# Patient Record
Sex: MALE | Race: WHITE
[De-identification: names, ages, dates, MRNs, and addresses within clinical notes are randomized per-mention and may not be internally consistent; named-entity substitution may affect disease eponyms.]

---

## 2018-09-21 ENCOUNTER — HOSPITAL ENCOUNTER (EMERGENCY)
Dept: HOSPITAL 35 - ER | Age: 78
Discharge: HOME | End: 2018-09-21
Payer: COMMERCIAL

## 2018-09-21 VITALS — HEIGHT: 72 IN | BODY MASS INDEX: 24.11 KG/M2 | WEIGHT: 178 LBS

## 2018-09-21 DIAGNOSIS — Z88.8: ICD-10-CM

## 2018-09-21 DIAGNOSIS — G89.29: Primary | ICD-10-CM

## 2018-09-21 DIAGNOSIS — Z79.899: ICD-10-CM

## 2018-09-21 DIAGNOSIS — I25.10: ICD-10-CM

## 2018-09-21 DIAGNOSIS — Z91.041: ICD-10-CM

## 2018-09-21 DIAGNOSIS — E78.5: ICD-10-CM

## 2018-09-21 DIAGNOSIS — Z88.0: ICD-10-CM

## 2018-09-21 DIAGNOSIS — Z88.1: ICD-10-CM

## 2018-09-21 DIAGNOSIS — Z86.73: ICD-10-CM

## 2018-09-21 DIAGNOSIS — I10: ICD-10-CM

## 2018-09-21 DIAGNOSIS — L40.9: ICD-10-CM

## 2018-09-21 DIAGNOSIS — M54.5: ICD-10-CM

## 2018-09-21 DIAGNOSIS — Z96.643: ICD-10-CM

## 2018-11-17 ENCOUNTER — HOSPITAL ENCOUNTER (EMERGENCY)
Dept: HOSPITAL 35 - ER | Age: 78
Discharge: HOME | End: 2018-11-17
Payer: COMMERCIAL

## 2018-11-17 VITALS — HEIGHT: 72 IN | WEIGHT: 178 LBS | BODY MASS INDEX: 24.11 KG/M2

## 2018-11-17 VITALS — DIASTOLIC BLOOD PRESSURE: 71 MMHG | SYSTOLIC BLOOD PRESSURE: 130 MMHG

## 2018-11-17 DIAGNOSIS — Z88.1: ICD-10-CM

## 2018-11-17 DIAGNOSIS — Z88.8: ICD-10-CM

## 2018-11-17 DIAGNOSIS — M48.02: ICD-10-CM

## 2018-11-17 DIAGNOSIS — M54.41: ICD-10-CM

## 2018-11-17 DIAGNOSIS — M54.42: Primary | ICD-10-CM

## 2018-11-17 DIAGNOSIS — N40.0: ICD-10-CM

## 2018-11-17 DIAGNOSIS — Z88.0: ICD-10-CM

## 2018-11-17 DIAGNOSIS — E78.5: ICD-10-CM

## 2018-11-17 DIAGNOSIS — Z96.643: ICD-10-CM

## 2018-11-17 DIAGNOSIS — I10: ICD-10-CM

## 2018-11-17 DIAGNOSIS — I25.10: ICD-10-CM

## 2018-11-17 DIAGNOSIS — Z98.62: ICD-10-CM

## 2018-11-17 DIAGNOSIS — Z91.041: ICD-10-CM

## 2018-11-17 LAB
ANION GAP SERPL CALC-SCNC: 9 MMOL/L (ref 7–16)
BASOPHILS NFR BLD AUTO: 0.9 % (ref 0–2)
BILIRUB UR-MCNC: NEGATIVE MG/DL
BUN SERPL-MCNC: 15 MG/DL (ref 7–18)
CALCIUM SERPL-MCNC: 10.3 MG/DL (ref 8.5–10.1)
CHLORIDE SERPL-SCNC: 101 MMOL/L (ref 98–107)
CO2 SERPL-SCNC: 25 MMOL/L (ref 21–32)
COLOR UR: YELLOW
CREAT SERPL-MCNC: 1.6 MG/DL (ref 0.7–1.3)
EOSINOPHIL NFR BLD: 1.3 % (ref 0–3)
ERYTHROCYTE [DISTWIDTH] IN BLOOD BY AUTOMATED COUNT: 14.6 % (ref 10.5–14.5)
GLUCOSE SERPL-MCNC: 87 MG/DL (ref 74–106)
GRANULOCYTES NFR BLD MANUAL: 58.4 % (ref 36–66)
HCT VFR BLD CALC: 34.1 % (ref 42–52)
HGB BLD-MCNC: 11.9 GM/DL (ref 14–18)
KETONES UR STRIP-MCNC: NEGATIVE MG/DL
LYMPHOCYTES NFR BLD AUTO: 30.9 % (ref 24–44)
MCH RBC QN AUTO: 33.9 PG (ref 26–34)
MCHC RBC AUTO-ENTMCNC: 35 G/DL (ref 28–37)
MCV RBC: 96.8 FL (ref 80–100)
MONOCYTES NFR BLD: 8.5 % (ref 1–8)
NEUTROPHILS # BLD: 4.5 THOU/UL (ref 1.4–8.2)
PLATELET # BLD: 161 THOU/UL (ref 150–400)
POTASSIUM SERPL-SCNC: 4.2 MMOL/L (ref 3.5–5.1)
RBC # BLD AUTO: 3.53 MIL/UL (ref 4.5–6)
RBC # UR STRIP: NEGATIVE /UL
SODIUM SERPL-SCNC: 135 MMOL/L (ref 136–145)
SP GR UR STRIP: 1.01 (ref 1–1.03)
URINE CLARITY: CLEAR
URINE GLUCOSE-RANDOM*: NEGATIVE
URINE LEUKOCYTES-REFLEX: NEGATIVE
URINE NITRITE-REFLEX: NEGATIVE
URINE PROTEIN (DIPSTICK): NEGATIVE
UROBILINOGEN UR STRIP-ACNC: 0.2 E.U./DL (ref 0.2–1)
WBC # BLD AUTO: 7.7 THOU/UL (ref 4–11)

## 2018-12-04 ENCOUNTER — HOSPITAL ENCOUNTER (EMERGENCY)
Dept: HOSPITAL 35 - ER | Age: 78
Discharge: HOME | End: 2018-12-04
Payer: COMMERCIAL

## 2018-12-04 VITALS — SYSTOLIC BLOOD PRESSURE: 152 MMHG | DIASTOLIC BLOOD PRESSURE: 74 MMHG

## 2018-12-04 VITALS — HEIGHT: 72 IN | BODY MASS INDEX: 23.03 KG/M2 | WEIGHT: 170 LBS

## 2018-12-04 DIAGNOSIS — Z91.041: ICD-10-CM

## 2018-12-04 DIAGNOSIS — R19.7: ICD-10-CM

## 2018-12-04 DIAGNOSIS — I25.10: ICD-10-CM

## 2018-12-04 DIAGNOSIS — M19.90: ICD-10-CM

## 2018-12-04 DIAGNOSIS — Z88.0: ICD-10-CM

## 2018-12-04 DIAGNOSIS — Z96.643: ICD-10-CM

## 2018-12-04 DIAGNOSIS — Z88.1: ICD-10-CM

## 2018-12-04 DIAGNOSIS — Z91.048: ICD-10-CM

## 2018-12-04 DIAGNOSIS — R10.33: Primary | ICD-10-CM

## 2018-12-04 DIAGNOSIS — L40.9: ICD-10-CM

## 2018-12-04 DIAGNOSIS — E78.5: ICD-10-CM

## 2018-12-04 DIAGNOSIS — I10: ICD-10-CM

## 2018-12-04 DIAGNOSIS — Z88.8: ICD-10-CM

## 2018-12-04 LAB
ALBUMIN SERPL-MCNC: 3.5 G/DL (ref 3.4–5)
ALT SERPL-CCNC: 19 U/L (ref 30–65)
ANION GAP SERPL CALC-SCNC: 8 MMOL/L (ref 7–16)
AST SERPL-CCNC: 17 U/L (ref 15–37)
BASOPHILS NFR BLD AUTO: 0.9 % (ref 0–2)
BILIRUB SERPL-MCNC: 0.4 MG/DL
BILIRUB UR-MCNC: NEGATIVE MG/DL
BUN SERPL-MCNC: 15 MG/DL (ref 7–18)
CALCIUM SERPL-MCNC: 9.6 MG/DL (ref 8.5–10.1)
CHLORIDE SERPL-SCNC: 102 MMOL/L (ref 98–107)
CO2 SERPL-SCNC: 26 MMOL/L (ref 21–32)
COLOR UR: YELLOW
CREAT SERPL-MCNC: 1.6 MG/DL (ref 0.7–1.3)
EOSINOPHIL NFR BLD: 0.5 % (ref 0–3)
ERYTHROCYTE [DISTWIDTH] IN BLOOD BY AUTOMATED COUNT: 14.5 % (ref 10.5–14.5)
GLUCOSE SERPL-MCNC: 106 MG/DL (ref 74–106)
GRANULOCYTES NFR BLD MANUAL: 58.6 % (ref 36–66)
HCT VFR BLD CALC: 31.1 % (ref 42–52)
HGB BLD-MCNC: 10.8 GM/DL (ref 14–18)
KETONES UR STRIP-MCNC: NEGATIVE MG/DL
LIPASE: 128 U/L (ref 73–393)
LYMPHOCYTES NFR BLD AUTO: 29.1 % (ref 24–44)
MCH RBC QN AUTO: 33.9 PG (ref 26–34)
MCHC RBC AUTO-ENTMCNC: 34.6 G/DL (ref 28–37)
MCV RBC: 98 FL (ref 80–100)
MONOCYTES NFR BLD: 10.9 % (ref 1–8)
NEUTROPHILS # BLD: 3.5 THOU/UL (ref 1.4–8.2)
NITRITE UR QL STRIP: NEGATIVE
PLATELET # BLD: 194 THOU/UL (ref 150–400)
POTASSIUM SERPL-SCNC: 3.8 MMOL/L (ref 3.5–5.1)
PROT SERPL-MCNC: 8.7 G/DL (ref 6.4–8.2)
RBC # BLD AUTO: 3.18 MIL/UL (ref 4.5–6)
RBC # UR STRIP: NEGATIVE /UL
SODIUM SERPL-SCNC: 136 MMOL/L (ref 136–145)
SP GR UR STRIP: 1.01 (ref 1–1.03)
URINE CLARITY: CLEAR
URINE GLUCOSE-RANDOM*: NEGATIVE
URINE LEUKOCYTES: NEGATIVE
URINE PROTEIN (DIPSTICK): NEGATIVE
UROBILINOGEN UR STRIP-ACNC: 0.2 E.U./DL (ref 0.2–1)
WBC # BLD AUTO: 6 THOU/UL (ref 4–11)

## 2019-03-13 ENCOUNTER — HOSPITAL ENCOUNTER (INPATIENT)
Dept: HOSPITAL 35 - ER | Age: 79
LOS: 3 days | Discharge: HOME | DRG: 438 | End: 2019-03-16
Attending: INTERNAL MEDICINE | Admitting: INTERNAL MEDICINE
Payer: COMMERCIAL

## 2019-03-13 VITALS — DIASTOLIC BLOOD PRESSURE: 62 MMHG | SYSTOLIC BLOOD PRESSURE: 143 MMHG

## 2019-03-13 VITALS — SYSTOLIC BLOOD PRESSURE: 119 MMHG | DIASTOLIC BLOOD PRESSURE: 56 MMHG

## 2019-03-13 VITALS — HEIGHT: 72.01 IN | BODY MASS INDEX: 24.26 KG/M2 | WEIGHT: 179.1 LBS

## 2019-03-13 VITALS — SYSTOLIC BLOOD PRESSURE: 144 MMHG | DIASTOLIC BLOOD PRESSURE: 63 MMHG

## 2019-03-13 VITALS — SYSTOLIC BLOOD PRESSURE: 113 MMHG | DIASTOLIC BLOOD PRESSURE: 57 MMHG

## 2019-03-13 DIAGNOSIS — D69.6: ICD-10-CM

## 2019-03-13 DIAGNOSIS — Z88.1: ICD-10-CM

## 2019-03-13 DIAGNOSIS — E53.8: ICD-10-CM

## 2019-03-13 DIAGNOSIS — K21.9: ICD-10-CM

## 2019-03-13 DIAGNOSIS — Z88.0: ICD-10-CM

## 2019-03-13 DIAGNOSIS — K85.90: Primary | ICD-10-CM

## 2019-03-13 DIAGNOSIS — Z95.5: ICD-10-CM

## 2019-03-13 DIAGNOSIS — Z72.89: ICD-10-CM

## 2019-03-13 DIAGNOSIS — M62.84: ICD-10-CM

## 2019-03-13 DIAGNOSIS — I25.2: ICD-10-CM

## 2019-03-13 DIAGNOSIS — Z88.8: ICD-10-CM

## 2019-03-13 DIAGNOSIS — G89.29: ICD-10-CM

## 2019-03-13 DIAGNOSIS — M54.5: ICD-10-CM

## 2019-03-13 DIAGNOSIS — I10: ICD-10-CM

## 2019-03-13 DIAGNOSIS — Z96.643: ICD-10-CM

## 2019-03-13 DIAGNOSIS — E78.5: ICD-10-CM

## 2019-03-13 DIAGNOSIS — Z79.899: ICD-10-CM

## 2019-03-13 DIAGNOSIS — Z87.891: ICD-10-CM

## 2019-03-13 DIAGNOSIS — D53.1: ICD-10-CM

## 2019-03-13 DIAGNOSIS — R74.0: ICD-10-CM

## 2019-03-13 DIAGNOSIS — Z91.041: ICD-10-CM

## 2019-03-13 DIAGNOSIS — N32.89: ICD-10-CM

## 2019-03-13 DIAGNOSIS — D53.9: ICD-10-CM

## 2019-03-13 DIAGNOSIS — Z86.73: ICD-10-CM

## 2019-03-13 DIAGNOSIS — K81.9: ICD-10-CM

## 2019-03-13 DIAGNOSIS — I25.10: ICD-10-CM

## 2019-03-13 DIAGNOSIS — E43: ICD-10-CM

## 2019-03-13 DIAGNOSIS — M19.90: ICD-10-CM

## 2019-03-13 DIAGNOSIS — N40.0: ICD-10-CM

## 2019-03-13 DIAGNOSIS — L40.9: ICD-10-CM

## 2019-03-13 DIAGNOSIS — Z79.82: ICD-10-CM

## 2019-03-13 LAB
ALBUMIN SERPL-MCNC: 3.7 G/DL (ref 3.4–5)
ALT SERPL-CCNC: 34 U/L (ref 30–65)
ANION GAP SERPL CALC-SCNC: 9 MMOL/L (ref 7–16)
AST SERPL-CCNC: 54 U/L (ref 15–37)
BASOPHILS NFR BLD AUTO: 0.3 % (ref 0–2)
BILIRUB SERPL-MCNC: 1.8 MG/DL
BILIRUB UR-MCNC: NEGATIVE MG/DL
BUN SERPL-MCNC: 15 MG/DL (ref 7–18)
CALCIUM SERPL-MCNC: 9.4 MG/DL (ref 8.5–10.1)
CHLORIDE SERPL-SCNC: 98 MMOL/L (ref 98–107)
CO2 SERPL-SCNC: 26 MMOL/L (ref 21–32)
COLOR UR: YELLOW
CREAT SERPL-MCNC: 1.3 MG/DL (ref 0.7–1.3)
EOSINOPHIL NFR BLD: 0.3 % (ref 0–3)
ERYTHROCYTE [DISTWIDTH] IN BLOOD BY AUTOMATED COUNT: 15.9 % (ref 10.5–14.5)
GLUCOSE SERPL-MCNC: 113 MG/DL (ref 74–106)
GRANULOCYTES NFR BLD MANUAL: 84.6 % (ref 36–66)
HCT VFR BLD CALC: 31.3 % (ref 42–52)
HGB BLD-MCNC: 10.7 GM/DL (ref 14–18)
KETONES UR STRIP-MCNC: NEGATIVE MG/DL
LYMPHOCYTES NFR BLD AUTO: 11.6 % (ref 24–44)
MCH RBC QN AUTO: 34.5 PG (ref 26–34)
MCHC RBC AUTO-ENTMCNC: 34.2 G/DL (ref 28–37)
MCV RBC: 100.9 FL (ref 80–100)
MONOCYTES NFR BLD: 3.2 % (ref 1–8)
NEUTROPHILS # BLD: 8 THOU/UL (ref 1.4–8.2)
NITRITE UR QL STRIP: NEGATIVE
PLATELET # BLD: 113 THOU/UL (ref 150–400)
POTASSIUM SERPL-SCNC: 3.7 MMOL/L (ref 3.5–5.1)
PROT SERPL-MCNC: 8.6 G/DL (ref 6.4–8.2)
RBC # BLD AUTO: 3.1 MIL/UL (ref 4.5–6)
RBC # UR STRIP: NEGATIVE /UL
SODIUM SERPL-SCNC: 133 MMOL/L (ref 136–145)
SP GR UR STRIP: <= 1.005 (ref 1–1.03)
URINE CLARITY: CLEAR
URINE GLUCOSE-RANDOM*: NEGATIVE
URINE LEUKOCYTES: NEGATIVE
URINE PROTEIN (DIPSTICK): NEGATIVE
UROBILINOGEN UR STRIP-ACNC: 0.2 E.U./DL (ref 0.2–1)
WBC # BLD AUTO: 9.4 THOU/UL (ref 4–11)

## 2019-03-13 PROCEDURE — 10045: CPT

## 2019-03-14 VITALS — SYSTOLIC BLOOD PRESSURE: 106 MMHG | DIASTOLIC BLOOD PRESSURE: 77 MMHG

## 2019-03-14 VITALS — SYSTOLIC BLOOD PRESSURE: 104 MMHG | DIASTOLIC BLOOD PRESSURE: 57 MMHG

## 2019-03-14 VITALS — DIASTOLIC BLOOD PRESSURE: 58 MMHG | SYSTOLIC BLOOD PRESSURE: 116 MMHG

## 2019-03-14 VITALS — DIASTOLIC BLOOD PRESSURE: 61 MMHG | SYSTOLIC BLOOD PRESSURE: 122 MMHG

## 2019-03-14 LAB
ALBUMIN SERPL-MCNC: 3 G/DL (ref 3.4–5)
ALT SERPL-CCNC: 35 U/L (ref 30–65)
ANION GAP SERPL CALC-SCNC: 9 MMOL/L (ref 7–16)
AST SERPL-CCNC: 45 U/L (ref 15–37)
BILIRUB SERPL-MCNC: 0.9 MG/DL
BUN SERPL-MCNC: 14 MG/DL (ref 7–18)
CALCIUM SERPL-MCNC: 8.4 MG/DL (ref 8.5–10.1)
CHLORIDE SERPL-SCNC: 104 MMOL/L (ref 98–107)
CO2 SERPL-SCNC: 25 MMOL/L (ref 21–32)
CREAT SERPL-MCNC: 1.4 MG/DL (ref 0.7–1.3)
ERYTHROCYTE [DISTWIDTH] IN BLOOD BY AUTOMATED COUNT: 15.9 % (ref 10.5–14.5)
GLUCOSE SERPL-MCNC: 90 MG/DL (ref 74–106)
HCT VFR BLD CALC: 27.4 % (ref 42–52)
HGB BLD-MCNC: 9.5 GM/DL (ref 14–18)
MCH RBC QN AUTO: 35.1 PG (ref 26–34)
MCHC RBC AUTO-ENTMCNC: 34.9 G/DL (ref 28–37)
MCV RBC: 100.8 FL (ref 80–100)
PLATELET # BLD: 91 THOU/UL (ref 150–400)
POTASSIUM SERPL-SCNC: 3.9 MMOL/L (ref 3.5–5.1)
PROT SERPL-MCNC: 7.5 G/DL (ref 6.4–8.2)
RBC # BLD AUTO: 2.72 MIL/UL (ref 4.5–6)
SODIUM SERPL-SCNC: 138 MMOL/L (ref 136–145)
WBC # BLD AUTO: 8.9 THOU/UL (ref 4–11)

## 2019-03-14 NOTE — NUR
ASSUMED PATIENT CARE AT 0715. PATIENT AWAKE, ALERT AND ORIENTED X 4. PATIENT
FRUSTRATED BECAUSE HE IS NPO. MOUTH SWABS PROVIDED WITH ICE WATER. USED URINAL
AT BEDSIDE. MEDICATED WITH MORPHINE THIS MORNING AND SLEPT FOR A COUPLE OF
HOURS. WIFE VISITED THIS AFTERNOON. PATIENT COMPLAINED THIS AFTERNOON THAT NO
ONE IS DOING ANYTHING FOR ME AND I WANT TO GO HOME. DR. MARQUEZ VISITED AND
GAVE EMOTIONAL SUPPORT. PATIENT THEN DECIDED HE WAS GOING HOME AND TOOK HIS
TELEMETRY OFF, AND STARTED PUTTING HIS CLOTHES ON. THIS RN CALLED DR. BROWN
AND RECEIVED ORDER FOR SEROQUEL AND TO TELL PATIENT IF HE WANTS TO GO HOME HE
WILL HAVE TO GO AMA. CA RN THEN SPOKE WITH PATIENT AND CONVINCED HIM TO
STAY. SEROQUEL GIVEN AND PATIENT THEN RESTED WITH NO FURTHER COMPLAINTS. FALL
PRECAUTIONS IN PLACE.

## 2019-03-14 NOTE — NUR
chart review, cm visited with pt at bedside. pt a & o x 3 with forgetfulness
and pleasant. [place]. intro to cm, home health, post acute. " i will be going
home with my wife she is nurse at [employer]. have 2 boy with good jobs
and daughter who good nurse to. no steps at home, cane, walker, wc, and power
scooter. wife does errands and medications, she lays them out and i take them.
she helps me with dressing and stay with me when shower. hh in past. why to i
need hh tell me what to do. wife drives. 1 little fall in past year. no rehab
in past. my wife a nurse."/[place]. will cont following as needed for dc
needs.

## 2019-03-15 VITALS — SYSTOLIC BLOOD PRESSURE: 132 MMHG | DIASTOLIC BLOOD PRESSURE: 77 MMHG

## 2019-03-15 VITALS — DIASTOLIC BLOOD PRESSURE: 60 MMHG | SYSTOLIC BLOOD PRESSURE: 154 MMHG

## 2019-03-15 VITALS — DIASTOLIC BLOOD PRESSURE: 60 MMHG | SYSTOLIC BLOOD PRESSURE: 152 MMHG

## 2019-03-15 LAB
ALBUMIN SERPL-MCNC: 2.9 G/DL (ref 3.4–5)
ALT SERPL-CCNC: 32 U/L (ref 30–65)
ANION GAP SERPL CALC-SCNC: 11 MMOL/L (ref 7–16)
AST SERPL-CCNC: 37 U/L (ref 15–37)
BILIRUB SERPL-MCNC: 0.9 MG/DL
BUN SERPL-MCNC: 15 MG/DL (ref 7–18)
CALCIUM SERPL-MCNC: 8.3 MG/DL (ref 8.5–10.1)
CHLORIDE SERPL-SCNC: 103 MMOL/L (ref 98–107)
CHOLEST SERPL-MCNC: 176 MG/DL (ref ?–200)
CO2 SERPL-SCNC: 25 MMOL/L (ref 21–32)
CREAT SERPL-MCNC: 1.2 MG/DL (ref 0.7–1.3)
FOLATE SERPL-MCNC: 11.3 NG/ML (ref 8.6–58.9)
GLUCOSE SERPL-MCNC: 72 MG/DL (ref 74–106)
HDLC SERPL-MCNC: 41 MG/DL (ref 40–?)
LDLC SERPL-MCNC: 120 MG/DL (ref ?–100)
POTASSIUM SERPL-SCNC: 3.9 MMOL/L (ref 3.5–5.1)
PROT SERPL-MCNC: 7.5 G/DL (ref 6.4–8.2)
SODIUM SERPL-SCNC: 139 MMOL/L (ref 136–145)
TC:HDL: 4.3 RATIO
TRIGL SERPL-MCNC: 76 MG/DL (ref ?–150)
VIT B12 SERPL-MCNC: 88 PG/ML (ref 193–986)
VLDLC SERPL CALC-MCNC: 15 MG/DL (ref ?–40)

## 2019-03-15 NOTE — NUR
progress
 
pt up with assist slightly unsteady. vss, iv to lf with ns@ 100ccs/hr. voiding
per urinal but incontinent x 2. 2 doses of morphine given with effect for
abdominal pain
pt slept
after. on room air, reoriented to room call light and poc, continue to
monitor.

## 2019-03-15 NOTE — NUR
ASSUMED CARE PT 0700. PAIN MANAGED WITH MEDICATIONS. ALERTX3 ANXIOUS,
IMPULSIVE, AND AGITATED REQUIRES REORRIANTATION AT TIMES. TOLERATING CLEAR
LIQUID DIET. DENIES USE OF ETOH. WIFE CONFIRMED DOES NOT DRINK. FULL FALL
PRECAUTIONS IN PLACE. CONTINUE TO MONITOR

## 2019-03-16 VITALS — DIASTOLIC BLOOD PRESSURE: 68 MMHG | SYSTOLIC BLOOD PRESSURE: 149 MMHG

## 2019-03-16 VITALS — SYSTOLIC BLOOD PRESSURE: 115 MMHG | DIASTOLIC BLOOD PRESSURE: 67 MMHG

## 2019-03-16 VITALS — DIASTOLIC BLOOD PRESSURE: 82 MMHG | SYSTOLIC BLOOD PRESSURE: 137 MMHG

## 2019-03-16 LAB
ALBUMIN SERPL-MCNC: 2.9 G/DL (ref 3.4–5)
ALT SERPL-CCNC: 25 U/L (ref 30–65)
ANION GAP SERPL CALC-SCNC: 10 MMOL/L (ref 7–16)
AST SERPL-CCNC: 28 U/L (ref 15–37)
BILIRUB SERPL-MCNC: 0.8 MG/DL
BUN SERPL-MCNC: 13 MG/DL (ref 7–18)
CALCIUM SERPL-MCNC: 8.5 MG/DL (ref 8.5–10.1)
CHLORIDE SERPL-SCNC: 103 MMOL/L (ref 98–107)
CO2 SERPL-SCNC: 23 MMOL/L (ref 21–32)
CREAT SERPL-MCNC: 1 MG/DL (ref 0.7–1.3)
ERYTHROCYTE [DISTWIDTH] IN BLOOD BY AUTOMATED COUNT: 15.9 % (ref 10.5–14.5)
GLUCOSE SERPL-MCNC: 66 MG/DL (ref 74–106)
HCT VFR BLD CALC: 29.2 % (ref 42–52)
HGB BLD-MCNC: 10 GM/DL (ref 14–18)
LIPASE: 167 U/L (ref 73–393)
MCH RBC QN AUTO: 34.8 PG (ref 26–34)
MCHC RBC AUTO-ENTMCNC: 34.2 G/DL (ref 28–37)
MCV RBC: 101.8 FL (ref 80–100)
PLATELET # BLD: 103 THOU/UL (ref 150–400)
POTASSIUM SERPL-SCNC: 3.5 MMOL/L (ref 3.5–5.1)
PROT SERPL-MCNC: 7.3 G/DL (ref 6.4–8.2)
RBC # BLD AUTO: 2.86 MIL/UL (ref 4.5–6)
SODIUM SERPL-SCNC: 136 MMOL/L (ref 136–145)
WBC # BLD AUTO: 9.4 THOU/UL (ref 4–11)

## 2019-07-05 ENCOUNTER — HOSPITAL ENCOUNTER (INPATIENT)
Dept: HOSPITAL 35 - ER | Age: 79
LOS: 18 days | Discharge: NURSING, REHAB OR LONG-TERM CARE | DRG: 871 | End: 2019-07-23
Attending: HOSPITALIST | Admitting: HOSPITALIST
Payer: COMMERCIAL

## 2019-07-05 VITALS — DIASTOLIC BLOOD PRESSURE: 64 MMHG | SYSTOLIC BLOOD PRESSURE: 126 MMHG

## 2019-07-05 VITALS — HEIGHT: 72.01 IN | BODY MASS INDEX: 21.55 KG/M2 | WEIGHT: 159.13 LBS

## 2019-07-05 VITALS — SYSTOLIC BLOOD PRESSURE: 130 MMHG | DIASTOLIC BLOOD PRESSURE: 67 MMHG

## 2019-07-05 VITALS — SYSTOLIC BLOOD PRESSURE: 142 MMHG | DIASTOLIC BLOOD PRESSURE: 71 MMHG

## 2019-07-05 VITALS — SYSTOLIC BLOOD PRESSURE: 164 MMHG | DIASTOLIC BLOOD PRESSURE: 88 MMHG

## 2019-07-05 DIAGNOSIS — G47.33: ICD-10-CM

## 2019-07-05 DIAGNOSIS — A41.51: Primary | ICD-10-CM

## 2019-07-05 DIAGNOSIS — D61.818: ICD-10-CM

## 2019-07-05 DIAGNOSIS — E78.5: ICD-10-CM

## 2019-07-05 DIAGNOSIS — I12.9: ICD-10-CM

## 2019-07-05 DIAGNOSIS — Z86.73: ICD-10-CM

## 2019-07-05 DIAGNOSIS — Z96.643: ICD-10-CM

## 2019-07-05 DIAGNOSIS — D50.9: ICD-10-CM

## 2019-07-05 DIAGNOSIS — Z88.0: ICD-10-CM

## 2019-07-05 DIAGNOSIS — D69.6: ICD-10-CM

## 2019-07-05 DIAGNOSIS — E16.2: ICD-10-CM

## 2019-07-05 DIAGNOSIS — B96.20: ICD-10-CM

## 2019-07-05 DIAGNOSIS — K83.1: ICD-10-CM

## 2019-07-05 DIAGNOSIS — K83.09: ICD-10-CM

## 2019-07-05 DIAGNOSIS — Z79.899: ICD-10-CM

## 2019-07-05 DIAGNOSIS — F03.90: ICD-10-CM

## 2019-07-05 DIAGNOSIS — I25.10: ICD-10-CM

## 2019-07-05 DIAGNOSIS — R79.89: ICD-10-CM

## 2019-07-05 DIAGNOSIS — L40.9: ICD-10-CM

## 2019-07-05 DIAGNOSIS — E87.6: ICD-10-CM

## 2019-07-05 DIAGNOSIS — R41.0: ICD-10-CM

## 2019-07-05 DIAGNOSIS — B96.89: ICD-10-CM

## 2019-07-05 DIAGNOSIS — Z87.11: ICD-10-CM

## 2019-07-05 DIAGNOSIS — K85.90: ICD-10-CM

## 2019-07-05 DIAGNOSIS — N18.9: ICD-10-CM

## 2019-07-05 DIAGNOSIS — Z95.5: ICD-10-CM

## 2019-07-05 DIAGNOSIS — M19.90: ICD-10-CM

## 2019-07-05 DIAGNOSIS — F10.10: ICD-10-CM

## 2019-07-05 DIAGNOSIS — R62.7: ICD-10-CM

## 2019-07-05 DIAGNOSIS — E87.1: ICD-10-CM

## 2019-07-05 DIAGNOSIS — K85.10: ICD-10-CM

## 2019-07-05 DIAGNOSIS — K26.9: ICD-10-CM

## 2019-07-05 DIAGNOSIS — E53.8: ICD-10-CM

## 2019-07-05 DIAGNOSIS — E46: ICD-10-CM

## 2019-07-05 DIAGNOSIS — K29.70: ICD-10-CM

## 2019-07-05 DIAGNOSIS — K80.00: ICD-10-CM

## 2019-07-05 DIAGNOSIS — R74.0: ICD-10-CM

## 2019-07-05 DIAGNOSIS — Z88.8: ICD-10-CM

## 2019-07-05 DIAGNOSIS — Z79.82: ICD-10-CM

## 2019-07-05 DIAGNOSIS — R33.9: ICD-10-CM

## 2019-07-05 DIAGNOSIS — Z87.891: ICD-10-CM

## 2019-07-05 DIAGNOSIS — N40.0: ICD-10-CM

## 2019-07-05 DIAGNOSIS — G93.41: ICD-10-CM

## 2019-07-05 DIAGNOSIS — N17.9: ICD-10-CM

## 2019-07-05 LAB
ALBUMIN SERPL-MCNC: 3.2 G/DL (ref 3.4–5)
ALT SERPL-CCNC: 71 U/L (ref 30–65)
ANION GAP SERPL CALC-SCNC: 12 MMOL/L (ref 7–16)
ANISOCYTOSIS BLD QL SMEAR: (no result)
APTT BLD: 34.8 SECONDS (ref 24.5–32.8)
AST SERPL-CCNC: 101 U/L (ref 15–37)
BACTERIA #/AREA URNS HPF: (no result) /HPF
BILIRUB SERPL-MCNC: 3.5 MG/DL
BILIRUB UR-MCNC: (no result) MG/DL
BUN SERPL-MCNC: 28 MG/DL (ref 7–18)
CALCIUM SERPL-MCNC: 9.2 MG/DL (ref 8.5–10.1)
CHLORIDE SERPL-SCNC: 98 MMOL/L (ref 98–107)
CO2 SERPL-SCNC: 24 MMOL/L (ref 21–32)
COLOR UR: YELLOW
CREAT SERPL-MCNC: 1.8 MG/DL (ref 0.7–1.3)
ERYTHROCYTE [DISTWIDTH] IN BLOOD BY AUTOMATED COUNT: 16.7 % (ref 10.5–14.5)
GLUCOSE SERPL-MCNC: 93 MG/DL (ref 74–106)
GRANULOCYTES NFR BLD MANUAL: 82 % (ref 36–66)
HCT VFR BLD CALC: 30.5 % (ref 42–52)
HGB BLD-MCNC: 10.4 GM/DL (ref 14–18)
INR PPP: 1.2
KETONES UR STRIP-MCNC: NEGATIVE MG/DL
LG PLATELETS BLD QL SMEAR: (no result)
LYMPHOCYTES NFR BLD AUTO: 1 % (ref 24–44)
MACROCYTES: (no result)
MCH RBC QN AUTO: 36.3 PG (ref 26–34)
MCHC RBC AUTO-ENTMCNC: 34 G/DL (ref 28–37)
MCV RBC: 106.9 FL (ref 80–100)
MONOCYTES NFR BLD: 1 % (ref 1–8)
NEUTROPHILS # BLD: 18.6 THOU/UL (ref 1.4–8.2)
NEUTS BAND NFR BLD: 16 % (ref 0–8)
NITRITE UR QL STRIP: NEGATIVE
PLATELET # BLD EST: (no result) 10*3/UL
PLATELET # BLD: 72 THOU/UL (ref 150–400)
POTASSIUM SERPL-SCNC: 4.1 MMOL/L (ref 3.5–5.1)
PROT SERPL-MCNC: 8.3 G/DL (ref 6.4–8.2)
PROTHROMBIN TIME: 12.6 SECONDS (ref 9.3–11.4)
RBC # BLD AUTO: 2.85 MIL/UL (ref 4.5–6)
RBC # UR STRIP: (no result) /UL
RBC #/AREA URNS HPF: (no result) /HPF (ref 0–2)
SODIUM SERPL-SCNC: 134 MMOL/L (ref 136–145)
SP GR UR STRIP: 1.01 (ref 1–1.03)
SQUAMOUS: (no result) /LPF (ref 0–3)
TROPONIN I SERPL-MCNC: <0.06 NG/ML (ref ?–0.06)
URINE CLARITY: CLEAR
URINE GLUCOSE-RANDOM*: NEGATIVE
URINE LEUKOCYTES: NEGATIVE
URINE PROTEIN (DIPSTICK): (no result)
UROBILINOGEN UR STRIP-ACNC: 1 E.U./DL (ref 0.2–1)
WBC # BLD AUTO: 19 THOU/UL (ref 4–11)
WBC #/AREA URNS HPF: (no result) /HPF (ref 0–5)

## 2019-07-05 PROCEDURE — 10879: CPT

## 2019-07-05 PROCEDURE — 62900: CPT

## 2019-07-05 PROCEDURE — 62110: CPT

## 2019-07-05 NOTE — NUR
ROSALIND IS NOT ABLE TO TAKE REPORT AND WILL CALL ME BACK PER ASHLEE; ALSO
REPORTS THAT ROOM IS STILL NOT CLEAN

## 2019-07-06 VITALS — SYSTOLIC BLOOD PRESSURE: 114 MMHG | DIASTOLIC BLOOD PRESSURE: 68 MMHG

## 2019-07-06 VITALS — DIASTOLIC BLOOD PRESSURE: 73 MMHG | SYSTOLIC BLOOD PRESSURE: 134 MMHG

## 2019-07-06 VITALS — DIASTOLIC BLOOD PRESSURE: 69 MMHG | SYSTOLIC BLOOD PRESSURE: 137 MMHG

## 2019-07-06 VITALS — DIASTOLIC BLOOD PRESSURE: 75 MMHG | SYSTOLIC BLOOD PRESSURE: 152 MMHG

## 2019-07-06 VITALS — SYSTOLIC BLOOD PRESSURE: 145 MMHG | DIASTOLIC BLOOD PRESSURE: 70 MMHG

## 2019-07-06 LAB
ALBUMIN SERPL-MCNC: 2.6 G/DL (ref 3.4–5)
ALT SERPL-CCNC: 51 U/L (ref 30–65)
AST SERPL-CCNC: 63 U/L (ref 15–37)
BILIRUB DIRECT SERPL-MCNC: 0.9 MG/DL
BILIRUB SERPL-MCNC: 1.4 MG/DL
ERYTHROCYTE [DISTWIDTH] IN BLOOD BY AUTOMATED COUNT: 17.4 % (ref 10.5–14.5)
HCT VFR BLD CALC: 28.2 % (ref 42–52)
HGB BLD-MCNC: 9.6 GM/DL (ref 14–18)
MCH RBC QN AUTO: 36.6 PG (ref 26–34)
MCHC RBC AUTO-ENTMCNC: 34 G/DL (ref 28–37)
MCV RBC: 107.6 FL (ref 80–100)
PLATELET # BLD: 49 THOU/UL (ref 150–400)
PROT SERPL-MCNC: 7.2 G/DL (ref 6.4–8.2)
RBC # BLD AUTO: 2.62 MIL/UL (ref 4.5–6)
TRIGL SERPL-MCNC: 107 MG/DL (ref ?–150)
WBC # BLD AUTO: 11.8 THOU/UL (ref 4–11)

## 2019-07-06 NOTE — NUR
TURNS Q2 HOURS. BECAME RESTLESS AND NEEDED A DOSE OF THE ANTI-ANXIETY
MEDICATION. APPLIED AN EXTERNAL CATHETER FOR COMFORT. CONTINUES ON IV FLUIDS.
CAREPLAN REVIEWED.

## 2019-07-06 NOTE — NUR
Assumed care of Pt at 0700.  Arousable but confused.  resting in bed for most
of day, fidgetty but not requiring anxiety meds.  currently afebrile.  low
sugars throughout the day - maintenance fluids adjusted per physician -
showing improvement.  ext condom cath in place - good output.  positive sepsis
screening - physician notified - abx adjusted.  mild tenderness to abdomen.
sinus on telemetry.  wife at bedside for part of day.  pt progressing toward
poc goals.

## 2019-07-07 VITALS — DIASTOLIC BLOOD PRESSURE: 86 MMHG | SYSTOLIC BLOOD PRESSURE: 150 MMHG

## 2019-07-07 VITALS — DIASTOLIC BLOOD PRESSURE: 90 MMHG | SYSTOLIC BLOOD PRESSURE: 169 MMHG

## 2019-07-07 VITALS — DIASTOLIC BLOOD PRESSURE: 84 MMHG | SYSTOLIC BLOOD PRESSURE: 154 MMHG

## 2019-07-07 VITALS — SYSTOLIC BLOOD PRESSURE: 153 MMHG | DIASTOLIC BLOOD PRESSURE: 62 MMHG

## 2019-07-07 VITALS — SYSTOLIC BLOOD PRESSURE: 159 MMHG | DIASTOLIC BLOOD PRESSURE: 81 MMHG

## 2019-07-07 LAB
ALBUMIN SERPL-MCNC: 2.5 G/DL (ref 3.4–5)
ALT SERPL-CCNC: 35 U/L (ref 30–65)
ANION GAP SERPL CALC-SCNC: 12 MMOL/L (ref 7–16)
ANION GAP SERPL CALC-SCNC: 7 MMOL/L (ref 7–16)
ANION GAP SERPL CALC-SCNC: 8 MMOL/L (ref 7–16)
AST SERPL-CCNC: 36 U/L (ref 15–37)
BASOPHILS NFR BLD AUTO: 0.2 % (ref 0–2)
BILIRUB SERPL-MCNC: 1.2 MG/DL
BUN SERPL-MCNC: 16 MG/DL (ref 7–18)
CALCIUM SERPL-MCNC: 8.2 MG/DL (ref 8.5–10.1)
CALCIUM SERPL-MCNC: 8.9 MG/DL (ref 8.5–10.1)
CALCIUM SERPL-MCNC: 9.2 MG/DL (ref 8.5–10.1)
CHLORIDE SERPL-SCNC: 93 MMOL/L (ref 98–107)
CHLORIDE SERPL-SCNC: 95 MMOL/L (ref 98–107)
CHLORIDE SERPL-SCNC: 96 MMOL/L (ref 98–107)
CO2 SERPL-SCNC: 22 MMOL/L (ref 21–32)
CO2 SERPL-SCNC: 25 MMOL/L (ref 21–32)
CO2 SERPL-SCNC: 26 MMOL/L (ref 21–32)
CREAT SERPL-MCNC: 1.1 MG/DL (ref 0.7–1.3)
CREAT SERPL-MCNC: 1.2 MG/DL (ref 0.7–1.3)
CREAT SERPL-MCNC: 1.2 MG/DL (ref 0.7–1.3)
EOSINOPHIL NFR BLD: 0.4 % (ref 0–3)
ERYTHROCYTE [DISTWIDTH] IN BLOOD BY AUTOMATED COUNT: 16.7 % (ref 10.5–14.5)
GLUCOSE SERPL-MCNC: 116 MG/DL (ref 74–106)
GLUCOSE SERPL-MCNC: 132 MG/DL (ref 74–106)
GLUCOSE SERPL-MCNC: 132 MG/DL (ref 74–106)
GRANULOCYTES NFR BLD MANUAL: 91.6 % (ref 36–66)
HCT VFR BLD CALC: 28.1 % (ref 42–52)
HGB BLD-MCNC: 9.6 GM/DL (ref 14–18)
LIPASE: 359 U/L (ref 73–393)
LYMPHOCYTES NFR BLD AUTO: 4.8 % (ref 24–44)
MCH RBC QN AUTO: 36.1 PG (ref 26–34)
MCHC RBC AUTO-ENTMCNC: 34.2 G/DL (ref 28–37)
MCV RBC: 105.4 FL (ref 80–100)
MONOCYTES NFR BLD: 3 % (ref 1–8)
NEUTROPHILS # BLD: 11.6 THOU/UL (ref 1.4–8.2)
PLATELET # BLD: 48 THOU/UL (ref 150–400)
POTASSIUM SERPL-SCNC: 2.9 MMOL/L (ref 3.5–5.1)
POTASSIUM SERPL-SCNC: 3.1 MMOL/L (ref 3.5–5.1)
POTASSIUM SERPL-SCNC: 3.9 MMOL/L (ref 3.5–5.1)
PROT SERPL-MCNC: 7.8 G/DL (ref 6.4–8.2)
RBC # BLD AUTO: 2.66 MIL/UL (ref 4.5–6)
SODIUM SERPL-SCNC: 127 MMOL/L (ref 136–145)
SODIUM SERPL-SCNC: 128 MMOL/L (ref 136–145)
SODIUM SERPL-SCNC: 129 MMOL/L (ref 136–145)
WBC # BLD AUTO: 12.7 THOU/UL (ref 4–11)

## 2019-07-07 NOTE — NUR
PATIENT IS PROGRESSING SLOWLY IN HIS CARE PLAN. VITAL SIGNS STABLE WITH NURSE
NOT PERCEIVING AND PAIN OR NAUSEA ON HIS BEHALF. ORIENTED TO SELF, PATIENT IS
UNABLE TO PARTICIPATE IN CARE OR CALL FOR NEEDS. BREATHING STABLE ON OXYGEN AS
EVIDENCED BY SPOT OXYGENATION CHECKS. PATIENT HAS SHOWN THICK YELLOW SPUTUM
WHICH REQUIRES SUCTION IN ORDER TO CLEAR. NPO THROUGHOUT SHIFT. PATIENT HAS
BEEN TURNED FREQUENTLY AND CHECKED FOR INCONTINENCE. ADEQUATE OUTPUT THROUGH
CONDOM CATHETER. CONTINUE PLAN OF CARE.

## 2019-07-08 VITALS — DIASTOLIC BLOOD PRESSURE: 81 MMHG | SYSTOLIC BLOOD PRESSURE: 140 MMHG

## 2019-07-08 VITALS — DIASTOLIC BLOOD PRESSURE: 75 MMHG | SYSTOLIC BLOOD PRESSURE: 173 MMHG

## 2019-07-08 VITALS — DIASTOLIC BLOOD PRESSURE: 96 MMHG | SYSTOLIC BLOOD PRESSURE: 170 MMHG

## 2019-07-08 VITALS — DIASTOLIC BLOOD PRESSURE: 106 MMHG | SYSTOLIC BLOOD PRESSURE: 147 MMHG

## 2019-07-08 LAB
ANION GAP SERPL CALC-SCNC: 9 MMOL/L (ref 7–16)
BUN SERPL-MCNC: 16 MG/DL (ref 7–18)
CALCIUM SERPL-MCNC: 8.5 MG/DL (ref 8.5–10.1)
CHLORIDE SERPL-SCNC: 97 MMOL/L (ref 98–107)
CO2 SERPL-SCNC: 25 MMOL/L (ref 21–32)
CREAT SERPL-MCNC: 1.1 MG/DL (ref 0.7–1.3)
GLUCOSE SERPL-MCNC: 105 MG/DL (ref 74–106)
POTASSIUM SERPL-SCNC: 3.6 MMOL/L (ref 3.5–5.1)
SODIUM SERPL-SCNC: 131 MMOL/L (ref 136–145)

## 2019-07-08 NOTE — NUR
INITIAL ASSESSMENT:
SW received high risk nursing referral. Reviewed chart and spoke with nursing
and attending physician. Pt was admitted from home due to abdominal
pain/pancreatitis. Pt had PIPIDA scan earlier today. SW met with pt and wife
at bedside. Introduced role of SW. Pt was resting during time of SW visit.
Pt's wife states that she and pt reside in a ranch style home. Prior to
admission, pt did not use any DME. Pt has always declined HH services in the
past. Pt's PCP is Dr. Maximo Goldberg. SW discussed post-acute needs. Pt's wife
states that if pt needs post-acute placement, she would like a referral to be
sent to [facility]. Pt's wife works at [employer]. SW is following
to assist a needed with discharge planning.

## 2019-07-08 NOTE — NUR
Patient making slow progress towards outcome goals. Vital signs and rhythm
stable. Neuro unchanged. Arousable but unable to follow commands or answer
questions. With periods of restlessness, thrashing legs around in bed,
grimacing looking like in pain, Medicated with Lorazepam and Morphine for
comfort. Incomtinent of bladder. Unable to maintain external male catheter..
IVFluids infusing Slowly correcting Sodium. Kept NPO for high risk for
aspiration.

## 2019-07-08 NOTE — NUR
MICAELA FROM [employer] CALLED OVER FOR AN RN TO GIVE MORPHINE 3.2MG IV FOR
BILILARY SCAN.  MORPHINE PULLED FROM PYXSIS AND 0.8MG WASTED WITH JOAO BURKS.
CONFIRMED CORRECT PT AND REVIEWED ALLERGIES.  IV FLUSHED WITHOUT ANY
DIFFICULTY AND MORPHINE 3.2MG IV GIVEN WITHOUT ANY COMPLICATIONS.

## 2019-07-09 VITALS — DIASTOLIC BLOOD PRESSURE: 73 MMHG | SYSTOLIC BLOOD PRESSURE: 145 MMHG

## 2019-07-09 VITALS — DIASTOLIC BLOOD PRESSURE: 81 MMHG | SYSTOLIC BLOOD PRESSURE: 155 MMHG

## 2019-07-09 VITALS — DIASTOLIC BLOOD PRESSURE: 77 MMHG | SYSTOLIC BLOOD PRESSURE: 147 MMHG

## 2019-07-09 VITALS — SYSTOLIC BLOOD PRESSURE: 184 MMHG | DIASTOLIC BLOOD PRESSURE: 94 MMHG

## 2019-07-09 VITALS — DIASTOLIC BLOOD PRESSURE: 74 MMHG | SYSTOLIC BLOOD PRESSURE: 144 MMHG

## 2019-07-09 VITALS — DIASTOLIC BLOOD PRESSURE: 76 MMHG | SYSTOLIC BLOOD PRESSURE: 138 MMHG

## 2019-07-09 VITALS — DIASTOLIC BLOOD PRESSURE: 73 MMHG | SYSTOLIC BLOOD PRESSURE: 152 MMHG

## 2019-07-09 VITALS — SYSTOLIC BLOOD PRESSURE: 158 MMHG | DIASTOLIC BLOOD PRESSURE: 78 MMHG

## 2019-07-09 VITALS — DIASTOLIC BLOOD PRESSURE: 76 MMHG | SYSTOLIC BLOOD PRESSURE: 140 MMHG

## 2019-07-09 VITALS — SYSTOLIC BLOOD PRESSURE: 152 MMHG | DIASTOLIC BLOOD PRESSURE: 77 MMHG

## 2019-07-09 VITALS — DIASTOLIC BLOOD PRESSURE: 89 MMHG | SYSTOLIC BLOOD PRESSURE: 166 MMHG

## 2019-07-09 LAB
ALBUMIN SERPL-MCNC: 2.1 G/DL (ref 3.4–5)
ALT SERPL-CCNC: 25 U/L (ref 30–65)
ANION GAP SERPL CALC-SCNC: 9 MMOL/L (ref 7–16)
APTT BLD: 32.8 SECONDS (ref 24.5–32.8)
AST SERPL-CCNC: 22 U/L (ref 15–37)
BILIRUB SERPL-MCNC: 1.4 MG/DL
BUN SERPL-MCNC: 17 MG/DL (ref 7–18)
CALCIUM SERPL-MCNC: 8.7 MG/DL (ref 8.5–10.1)
CHLORIDE SERPL-SCNC: 95 MMOL/L (ref 98–107)
CO2 SERPL-SCNC: 26 MMOL/L (ref 21–32)
CREAT SERPL-MCNC: 1.1 MG/DL (ref 0.7–1.3)
ERYTHROCYTE [DISTWIDTH] IN BLOOD BY AUTOMATED COUNT: 16.6 % (ref 10.5–14.5)
FOLATE SERPL-MCNC: 4.2 NG/ML (ref 8.6–58.9)
GLUCOSE SERPL-MCNC: 110 MG/DL (ref 74–106)
HCT VFR BLD CALC: 27.7 % (ref 42–52)
HGB BLD-MCNC: 9.5 GM/DL (ref 14–18)
INR PPP: 1
MCH RBC QN AUTO: 36.1 PG (ref 26–34)
MCHC RBC AUTO-ENTMCNC: 34.3 G/DL (ref 28–37)
MCV RBC: 105.2 FL (ref 80–100)
PLATELET # BLD: 67 THOU/UL (ref 150–400)
POTASSIUM SERPL-SCNC: 3.1 MMOL/L (ref 3.5–5.1)
PROT SERPL-MCNC: 7.4 G/DL (ref 6.4–8.2)
PROTHROMBIN TIME: 10.9 SECONDS (ref 9.3–11.4)
RBC # BLD AUTO: 2.63 MIL/UL (ref 4.5–6)
SODIUM SERPL-SCNC: 130 MMOL/L (ref 136–145)
VIT B12 SERPL-MCNC: 114 PG/ML (ref 193–986)
WBC # BLD AUTO: 11.2 THOU/UL (ref 4–11)

## 2019-07-09 PROCEDURE — 0F9430Z DRAINAGE OF GALLBLADDER WITH DRAINAGE DEVICE, PERCUTANEOUS APPROACH: ICD-10-PCS | Performed by: RADIOLOGY

## 2019-07-09 NOTE — NUR
SW reviewed chart and spoke with nursing and attending physician. ID and
surgery consulted due to cystic duct stone. SW is following to assist as
needed with discharge planning.

## 2019-07-09 NOTE — NUR
Assess due to dx of acute pancreatitis.  Hx etoh abuse.  Has been npo x 4
days, lipase levels improving.  No hx wt loss.  On D10 fluids.  B12 and folate
levels are low, recommend supplementation.  Otherwise low nutrition risk and
will followup again on 7/12 for ability to advance diet.

## 2019-07-09 NOTE — NUR
Assumed care at 0700 this AM. Patient not following commands nor is oriented.
Patient has only made incomprehensible sounds and has slept most of the day.
Patient does open eyes to stimulus and occassionally to noise around him.
Patient taken to IR today for a cholecystostomy tube placement. Tube remains
intact, hanging to gravity and draining well. Post procedure vital signs
completed per written orders. Telephone consent for procedure obtained by two
nurses this afternoon from the patients wife, Jia. Jia called back after
the procedure and was given an update on his status and how he is doing; she
said she will be in tomorrow to visit her . Fluids infusing and
antibiotics hung. Fall precautions in place. Slow progress toward plan of
care.

## 2019-07-09 NOTE — NUR
Pt. has slept well most of the night. Repositioned prn for comfort. Moans when
being cleaned and repositioned then calm and asleep when resting.Bed alarm for
safety.O2 at 2L/NC , no respiratory distress. Incontinent of bladder ,
attempted external male cath but didn't stay in place. Oriented to self only.
Pt. reporiented. Kept NPO per order. Will continue to monitor.

## 2019-07-10 VITALS — SYSTOLIC BLOOD PRESSURE: 117 MMHG | DIASTOLIC BLOOD PRESSURE: 50 MMHG

## 2019-07-10 VITALS — SYSTOLIC BLOOD PRESSURE: 135 MMHG | DIASTOLIC BLOOD PRESSURE: 66 MMHG

## 2019-07-10 VITALS — DIASTOLIC BLOOD PRESSURE: 61 MMHG | SYSTOLIC BLOOD PRESSURE: 133 MMHG

## 2019-07-10 VITALS — DIASTOLIC BLOOD PRESSURE: 76 MMHG | SYSTOLIC BLOOD PRESSURE: 138 MMHG

## 2019-07-10 VITALS — DIASTOLIC BLOOD PRESSURE: 79 MMHG | SYSTOLIC BLOOD PRESSURE: 151 MMHG

## 2019-07-10 LAB
ALBUMIN SERPL-MCNC: 1.9 G/DL (ref 3.4–5)
ALT SERPL-CCNC: 23 U/L (ref 30–65)
ANION GAP SERPL CALC-SCNC: 10 MMOL/L (ref 7–16)
AST SERPL-CCNC: 29 U/L (ref 15–37)
BILIRUB SERPL-MCNC: 0.9 MG/DL
BUN SERPL-MCNC: 20 MG/DL (ref 7–18)
CALCIUM SERPL-MCNC: 8.8 MG/DL (ref 8.5–10.1)
CHLORIDE SERPL-SCNC: 96 MMOL/L (ref 98–107)
CO2 SERPL-SCNC: 25 MMOL/L (ref 21–32)
CREAT SERPL-MCNC: 1.1 MG/DL (ref 0.7–1.3)
ERYTHROCYTE [DISTWIDTH] IN BLOOD BY AUTOMATED COUNT: 16.6 % (ref 10.5–14.5)
GLUCOSE SERPL-MCNC: 121 MG/DL (ref 74–106)
HCT VFR BLD CALC: 27.6 % (ref 42–52)
HGB BLD-MCNC: 9.6 GM/DL (ref 14–18)
MCH RBC QN AUTO: 36.8 PG (ref 26–34)
MCHC RBC AUTO-ENTMCNC: 34.8 G/DL (ref 28–37)
MCV RBC: 105.8 FL (ref 80–100)
PLATELET # BLD: 76 THOU/UL (ref 150–400)
POTASSIUM SERPL-SCNC: 2.9 MMOL/L (ref 3.5–5.1)
PROT SERPL-MCNC: 7.1 G/DL (ref 6.4–8.2)
RBC # BLD AUTO: 2.61 MIL/UL (ref 4.5–6)
SODIUM SERPL-SCNC: 131 MMOL/L (ref 136–145)
WBC # BLD AUTO: 9.4 THOU/UL (ref 4–11)

## 2019-07-10 NOTE — NUR
Pt. more awake,verbalized some words and can follow instructions. Only moans
when being repositioned otherwise comfortable. Bed alarm for safety.
Maintaining O2 sat in the mid to upper 90's on 3L/NC. No respiratory distress.
Kept NPO per order. Frequent oral care done. Making progress towards care plan
goals.

## 2019-07-10 NOTE — NUR
Recommend consider change IVF to clinimix PPN at 100ml/hr due to day 5 NPO
status.  Recommend check triglyceride levels.

## 2019-07-10 NOTE — NUR
SW reviewed chart and spoke with nursing and attending physician. PT had drain
placed in IR yesterday. Pt is slowly progressing towards goals for discharge.
SW met with pt and wife at bedside to discuss post-acute plans. Pt's wife
works at [employer] and would like a referral to be sent to their SNF.
Discharge planner to fax referral. SW is following to assist as needed with
discharge planning.

## 2019-07-10 NOTE — NUR
PATIENT HAS RESTED IN BED THROUGH THE DAY. EARLIER HE WAS NOTED TO BE IN PAIN.
PRN PAIN MED ADMINISTERED. IT WAS EFFECTIVE AS HE SLEPT FOR A WHILE.WIFE HERE
TO VISIT AND HE WAS CALM DURING HER VISIT. RESPIRATIONS NON LABORED. WILL CONT
WITH PLAN OF CARE.

## 2019-07-11 VITALS — SYSTOLIC BLOOD PRESSURE: 142 MMHG | DIASTOLIC BLOOD PRESSURE: 77 MMHG

## 2019-07-11 VITALS — DIASTOLIC BLOOD PRESSURE: 73 MMHG | SYSTOLIC BLOOD PRESSURE: 142 MMHG

## 2019-07-11 VITALS — SYSTOLIC BLOOD PRESSURE: 147 MMHG | DIASTOLIC BLOOD PRESSURE: 74 MMHG

## 2019-07-11 VITALS — SYSTOLIC BLOOD PRESSURE: 153 MMHG | DIASTOLIC BLOOD PRESSURE: 65 MMHG

## 2019-07-11 LAB
ALBUMIN SERPL-MCNC: 2 G/DL (ref 3.4–5)
ALT SERPL-CCNC: 20 U/L (ref 30–65)
AST SERPL-CCNC: 25 U/L (ref 15–37)
BILIRUB DIRECT SERPL-MCNC: 0.5 MG/DL
BILIRUB SERPL-MCNC: 0.9 MG/DL
PROT SERPL-MCNC: 6.6 G/DL (ref 6.4–8.2)
TRIGL SERPL-MCNC: 60 MG/DL (ref ?–150)

## 2019-07-11 NOTE — NUR
Pt. has been more awake and answered some questions appropriately. Able to
state name and he's at the hospital. Medicated for pain with some relief. When
asked how bad the pain is, he stated bad at 10. Also verbalized pain down to
half after pain med. He has been repositioned for comfort. PPN infusing , kept
NPO. Oral care done. ExternaL cath in place with adequate urine output. Nae
drain intact with brownish dge. He slept fair during the night. Making
progress towards care plan goals.

## 2019-07-11 NOTE — NUR
PATIENT CONT TO BE QUITE WEAK THROUGH THE DAY. IN THE AM HE WAS ABLE TO ANSWER
SIMPLE QUESTIONS. BUT THEN HE WAS IN A LOT PAIN. PRN PAIN MED ADMINISTERED.
IN BETWEEN SLEEPING, PATIENT IS NOTED TO BE CRYING. BILIARY DRAIN PATENT AND
EASILY FLUSHED. WILL CONT WITH PLAN OF CARE.

## 2019-07-11 NOTE — NUR
SW reviewed chart and spoke with nursing and attending physician. Pt is slowly
progressing towards goals for discharge. [facility] is able to accept
pt when he is medically stable. [facility] liaison onsite this afternoon.
JAVIER is following to assist as needed with discharge planning.

## 2019-07-12 VITALS — DIASTOLIC BLOOD PRESSURE: 77 MMHG | SYSTOLIC BLOOD PRESSURE: 144 MMHG

## 2019-07-12 VITALS — SYSTOLIC BLOOD PRESSURE: 136 MMHG | DIASTOLIC BLOOD PRESSURE: 58 MMHG

## 2019-07-12 VITALS — DIASTOLIC BLOOD PRESSURE: 75 MMHG | SYSTOLIC BLOOD PRESSURE: 143 MMHG

## 2019-07-12 NOTE — NUR
SW reviewed chart and spoke with nursing and attending physician. Pt is slowly
progressing towards goals for discharge. No weekend discharge planned. JAVIER
updated Fadumo at [facility], who are following and able to accept pt
when medically stable. JAVIER is following to assist as needed with discharge
planning.

## 2019-07-12 NOTE — NUR
Patient making slow progress towards outcome goals. More alert, attempts to
respond in garbled speech. PPN infusing. Biliary drain draining green liquid.
Moans and yells out when turned then settles down after. Incontinent of urine,
attempting to maintain male external catheter in place. Intermittent hiccups.
Bital signs and rhythmstable.

## 2019-07-13 VITALS — SYSTOLIC BLOOD PRESSURE: 149 MMHG | DIASTOLIC BLOOD PRESSURE: 87 MMHG

## 2019-07-13 VITALS — SYSTOLIC BLOOD PRESSURE: 143 MMHG | DIASTOLIC BLOOD PRESSURE: 75 MMHG

## 2019-07-13 VITALS — SYSTOLIC BLOOD PRESSURE: 132 MMHG | DIASTOLIC BLOOD PRESSURE: 72 MMHG

## 2019-07-13 VITALS — SYSTOLIC BLOOD PRESSURE: 138 MMHG | DIASTOLIC BLOOD PRESSURE: 74 MMHG

## 2019-07-13 LAB
ALBUMIN SERPL-MCNC: 2 G/DL (ref 3.4–5)
ALT SERPL-CCNC: 23 U/L (ref 30–65)
ANION GAP SERPL CALC-SCNC: 9 MMOL/L (ref 7–16)
ANISOCYTOSIS BLD QL SMEAR: (no result)
AST SERPL-CCNC: 37 U/L (ref 15–37)
BILIRUB SERPL-MCNC: 0.8 MG/DL
BUN SERPL-MCNC: 43 MG/DL (ref 7–18)
CALCIUM SERPL-MCNC: 8.5 MG/DL (ref 8.5–10.1)
CHLORIDE SERPL-SCNC: 96 MMOL/L (ref 98–107)
CO2 SERPL-SCNC: 24 MMOL/L (ref 21–32)
CREAT SERPL-MCNC: 1 MG/DL (ref 0.7–1.3)
ERYTHROCYTE [DISTWIDTH] IN BLOOD BY AUTOMATED COUNT: 16 % (ref 10.5–14.5)
GLUCOSE SERPL-MCNC: 112 MG/DL (ref 74–106)
GRANULOCYTES NFR BLD MANUAL: 84 % (ref 36–66)
HCT VFR BLD CALC: 26.4 % (ref 42–52)
HGB BLD-MCNC: 9.1 GM/DL (ref 14–18)
LG PLATELETS BLD QL SMEAR: (no result)
LYMPHOCYTES NFR BLD AUTO: 8 % (ref 24–44)
MAGNESIUM SERPL-MCNC: 2.2 MG/DL (ref 1.8–2.4)
MCH RBC QN AUTO: 36 PG (ref 26–34)
MCHC RBC AUTO-ENTMCNC: 34.2 G/DL (ref 28–37)
MCV RBC: 105.2 FL (ref 80–100)
MONOCYTES NFR BLD: 1 % (ref 1–8)
NEUTROPHILS # BLD: 13.5 THOU/UL (ref 1.4–8.2)
NEUTS BAND NFR BLD: 5 % (ref 0–8)
PLATELET # BLD: 117 THOU/UL (ref 150–400)
POLYCHROMASIA BLD QL SMEAR: (no result)
POTASSIUM SERPL-SCNC: 4.4 MMOL/L (ref 3.5–5.1)
PROT SERPL-MCNC: 7.1 G/DL (ref 6.4–8.2)
RBC # BLD AUTO: 2.51 MIL/UL (ref 4.5–6)
SODIUM SERPL-SCNC: 129 MMOL/L (ref 136–145)
VARIANT LYMPHS NFR BLD MANUAL: 2 %
WBC # BLD AUTO: 15.2 THOU/UL (ref 4–11)

## 2019-07-13 NOTE — NUR
SLEPT MOST OF SHIFT. TURNED EVERY 2 HOURS. PULLED CONDOM CATH OFF STATED NO
WHEN SAID WE COULD REPLACE. ORIENTED TO SELF ONLY. MOROPHINE GIVEN X1 FOR
COMFORT. WORKING ON GOALS AND PLAN OF CARE FOR NOC. PROGRESSING SLOWLY TOWARDS
DISCHARGE GOALS. CONTINUE TO ASSES CLOSELY.

## 2019-07-14 VITALS — SYSTOLIC BLOOD PRESSURE: 134 MMHG | DIASTOLIC BLOOD PRESSURE: 73 MMHG

## 2019-07-14 VITALS — SYSTOLIC BLOOD PRESSURE: 139 MMHG | DIASTOLIC BLOOD PRESSURE: 63 MMHG

## 2019-07-14 VITALS — DIASTOLIC BLOOD PRESSURE: 67 MMHG | SYSTOLIC BLOOD PRESSURE: 130 MMHG

## 2019-07-14 VITALS — SYSTOLIC BLOOD PRESSURE: 133 MMHG | DIASTOLIC BLOOD PRESSURE: 61 MMHG

## 2019-07-14 LAB
ALBUMIN SERPL-MCNC: 1.9 G/DL (ref 3.4–5)
ALT SERPL-CCNC: 17 U/L (ref 30–65)
ANION GAP SERPL CALC-SCNC: 9 MMOL/L (ref 7–16)
AST SERPL-CCNC: 38 U/L (ref 15–37)
BILIRUB SERPL-MCNC: 1.3 MG/DL
BILIRUB UR-MCNC: NEGATIVE MG/DL
BUN SERPL-MCNC: 44 MG/DL (ref 7–18)
CALCIUM SERPL-MCNC: 8.8 MG/DL (ref 8.5–10.1)
CHLORIDE SERPL-SCNC: 96 MMOL/L (ref 98–107)
CO2 SERPL-SCNC: 21 MMOL/L (ref 21–32)
COLOR UR: YELLOW
CREAT SERPL-MCNC: 1.2 MG/DL (ref 0.7–1.3)
ERYTHROCYTE [DISTWIDTH] IN BLOOD BY AUTOMATED COUNT: 16.4 % (ref 10.5–14.5)
GLUCOSE SERPL-MCNC: 116 MG/DL (ref 74–106)
HCT VFR BLD CALC: 29.9 % (ref 42–52)
HGB BLD-MCNC: 10 GM/DL (ref 14–18)
KETONES UR STRIP-MCNC: NEGATIVE MG/DL
LIPASE: 823 U/L (ref 73–393)
MCH RBC QN AUTO: 36.3 PG (ref 26–34)
MCHC RBC AUTO-ENTMCNC: 33.4 G/DL (ref 28–37)
MCV RBC: 108.7 FL (ref 80–100)
PLATELET # BLD: 137 THOU/UL (ref 150–400)
POTASSIUM SERPL-SCNC: 4.9 MMOL/L (ref 3.5–5.1)
PROT SERPL-MCNC: 8 G/DL (ref 6.4–8.2)
RBC # BLD AUTO: 2.75 MIL/UL (ref 4.5–6)
RBC # UR STRIP: (no result) /UL
RBC #/AREA URNS HPF: (no result) /HPF (ref 0–2)
SODIUM SERPL-SCNC: 126 MMOL/L (ref 136–145)
SP GR UR STRIP: 1.02 (ref 1–1.03)
URINE CLARITY: CLEAR
URINE GLUCOSE-RANDOM*: NEGATIVE
URINE LEUKOCYTES-REFLEX: NEGATIVE
URINE NITRITE-REFLEX: NEGATIVE
URINE PROTEIN (DIPSTICK): (no result)
URINE WBC-REFLEX: (no result) /HPF (ref 0–5)
UROBILINOGEN UR STRIP-ACNC: 0.2 E.U./DL (ref 0.2–1)
WBC # BLD AUTO: 21.2 THOU/UL (ref 4–11)

## 2019-07-14 NOTE — NUR
PT PROGRESSING! SAT ON EDGE OF BED, UP TO BEDSIDE COMMODE WITH WALKER AND 2
ASSIST. HAD SMALL SOFT DK BROWN BM. AMBULATED FROM ONE SIDE OF BED TO THE
OTHER TO GET INTO CHAIR. SAT IN CHAIR FOR 2 HRS. UPON RETURNING TO BED, HE
AMBULATED AROUND THE BED. CONTINUES TO C/O OF DISCOMFORT TO R SHOULDER WITH
MOVEMENT, THEN DISCOMFORT TOLERABLE WITHOUT MOVEMENT. HE RELUCTANTLY CLEANS
HIS MOUTH WITH SWAB. WIFE SHAVED HIS DAVIS.

## 2019-07-14 NOTE — NUR
RETURNED FROM RADIOLOGY PER CART. URINE OBTAINED, LAB PRESENT-BLOOD & BLOOD
CX'S DRAWN. PT FINALLY COOPERATIVE ENOUGH TO ALLOW ORAL CARE. COPIOUS THICK
CHUNKS OF DEBRI OBTAINED FROM MOUTH. RADIOLOGY TECH EXPLAIN, PT BEGAN HOLDING
HIS R SHOULDER WITH C/O PAIN WHEN MOVED WHILE IN CT. CURRENTLY GUARDS SHOULDER
WITH MOVEMENT.

## 2019-07-14 NOTE — NUR
SLEPT MOST OF SHIFT. TURNED EVERY 2 HOURS FOR COMFORT AND SKIN CARE. MOANS AND
YELLS EVERY TIME TOUCHED OR TURNED. WORKING ON GOALS AND PLAN OF CARE FOR NOC.
PROGRESSING TOWARDS GOALS FOR TRANSFER TO REHAB PRIOR TO SURGERY. REMAINS
ORIENTED TO SELF ONLY AND WHISPERS. CONTINUE TO ASSES.

## 2019-07-14 NOTE — NUR
WHENEVER PT IS MERELY TOUCHED, HE MOANS OR HOLLERS OUT PROFANITY. MOUTH CAKED
WITH DEBRI, PT FIGHTS MAKING ORAL CARE IMPOSSIBLE. RESP EVEN AND UNLABORED,
RUQ DRAIN PATENT WITH PATRICA DRAINAGE, CONDOM CATH WITH ADEQUATE DRAINAGE. SEE
ASSESSMENT FOR DETAILS.
DR. CARVER AND DR. FERNANDEZ PRESENT, THEY COLLABORATED WITH EACH OTHER AND WITH
RN. SEE ORDERS. PT TO CT PER CART.

## 2019-07-15 VITALS — DIASTOLIC BLOOD PRESSURE: 72 MMHG | SYSTOLIC BLOOD PRESSURE: 134 MMHG

## 2019-07-15 VITALS — SYSTOLIC BLOOD PRESSURE: 136 MMHG | DIASTOLIC BLOOD PRESSURE: 77 MMHG

## 2019-07-15 VITALS — SYSTOLIC BLOOD PRESSURE: 139 MMHG | DIASTOLIC BLOOD PRESSURE: 88 MMHG

## 2019-07-15 VITALS — DIASTOLIC BLOOD PRESSURE: 70 MMHG | SYSTOLIC BLOOD PRESSURE: 144 MMHG

## 2019-07-15 LAB
ALBUMIN SERPL-MCNC: 1.9 G/DL (ref 3.4–5)
ALT SERPL-CCNC: 19 U/L (ref 30–65)
ANION GAP SERPL CALC-SCNC: 11 MMOL/L (ref 7–16)
ANISOCYTOSIS BLD QL SMEAR: (no result)
AST SERPL-CCNC: 38 U/L (ref 15–37)
BASOPHILS NFR BLD AUTO: 0 % (ref 0–2)
BILIRUB SERPL-MCNC: 0.9 MG/DL
BUN SERPL-MCNC: 50 MG/DL (ref 7–18)
CALCIUM SERPL-MCNC: 9.4 MG/DL (ref 8.5–10.1)
CHLORIDE SERPL-SCNC: 96 MMOL/L (ref 98–107)
CO2 SERPL-SCNC: 22 MMOL/L (ref 21–32)
CREAT SERPL-MCNC: 1.2 MG/DL (ref 0.7–1.3)
EOSINOPHIL NFR BLD: 0 % (ref 0–3)
ERYTHROCYTE [DISTWIDTH] IN BLOOD BY AUTOMATED COUNT: 16.1 % (ref 10.5–14.5)
ERYTHROCYTE [DISTWIDTH] IN BLOOD BY AUTOMATED COUNT: 16.3 % (ref 10.5–14.5)
GLUCOSE SERPL-MCNC: 124 MG/DL (ref 74–106)
GRANULOCYTES NFR BLD MANUAL: 84 % (ref 36–66)
HCT VFR BLD CALC: 24.6 % (ref 42–52)
HCT VFR BLD CALC: 25.8 % (ref 42–52)
HGB BLD-MCNC: 8.3 GM/DL (ref 14–18)
HGB BLD-MCNC: 8.6 GM/DL (ref 14–18)
LIPASE: 963 U/L (ref 73–393)
LYMPHOCYTES NFR BLD AUTO: 5 % (ref 24–44)
MACROCYTES: (no result)
MCH RBC QN AUTO: 35.1 PG (ref 26–34)
MCH RBC QN AUTO: 35.4 PG (ref 26–34)
MCHC RBC AUTO-ENTMCNC: 33.4 G/DL (ref 28–37)
MCHC RBC AUTO-ENTMCNC: 33.9 G/DL (ref 28–37)
MCV RBC: 104.4 FL (ref 80–100)
MCV RBC: 105 FL (ref 80–100)
METAMYELOCYTES NFR BLD: 1 %
MONOCYTES NFR BLD: 8 % (ref 1–8)
MYELOCYTES NFR BLD: 1 %
NEUTROPHILS # BLD: 16.9 THOU/UL (ref 1.4–8.2)
NEUTS BAND NFR BLD: 1 % (ref 0–8)
PLATELET # BLD: 207 THOU/UL (ref 150–400)
PLATELET # BLD: 213 THOU/UL (ref 150–400)
POTASSIUM SERPL-SCNC: 4.4 MMOL/L (ref 3.5–5.1)
PROT SERPL-MCNC: 8.1 G/DL (ref 6.4–8.2)
RBC # BLD AUTO: 2.35 MIL/UL (ref 4.5–6)
RBC # BLD AUTO: 2.46 MIL/UL (ref 4.5–6)
SODIUM SERPL-SCNC: 129 MMOL/L (ref 136–145)
WBC # BLD AUTO: 19.9 THOU/UL (ref 4–11)
WBC # BLD AUTO: 20.7 THOU/UL (ref 4–11)

## 2019-07-15 NOTE — NUR
Pt. has been more awake , able to state name and he's at the hospital. He
follows commands and answered questions appropriately. He has been
repositioned for comfort. He only moans when being turned otherwise
comfortable.Bed alarm on for safety. Kept NPO per order , PPN infusing.
Kept male external cath all night except this am he took it off. Total bed
change done, provided urinal and gave instructions to call staff if he needs
help. Slowly making progress towards are plan goals.

## 2019-07-15 NOTE — NUR
PATIENT HAS RESTED IN BED THROUGH THE. DID GET UP TO CHAIR WITH THERAPY AND
TOLERATING SITTING IN THE CHAIR FOR ABOUT TWO HOURS. EXTERNAL CATH REPLACED.
PATIENT DOES HAVE PAIN WHEN MOVED. HOWEVER HE DOES NOT HAVE PAIN WHEN HE IS
NOT MOVING. RESPIRATIONS NON LABORED. WILL CONT WITH PLAN OF CARE.

## 2019-07-15 NOTE — NUR
DIscharge Planning:  choco sent updates to Fadumo at [facility], fax [phone]
 
CHOCO sent updates to [facility] fax # [phone].  CHOCO also notified Fadumo
at facility to expect updates.  Patient won't likely discharge till mid to end
of this week.

## 2019-07-15 NOTE — NUR
SW reviewed chart and spoke with nursing and attending physician. Pt is slowly
progressing towards goals for discharge. Pt has drain in place. Pt is on IV
abx. WBC 20.7 today.  Discharge planner to fax updates to [facility] for
review. Plan is for pt to d/c to [facility] when medically stable. JAVIER is
following to assist as needed with discharge planning.

## 2019-07-16 VITALS — SYSTOLIC BLOOD PRESSURE: 130 MMHG | DIASTOLIC BLOOD PRESSURE: 71 MMHG

## 2019-07-16 VITALS — DIASTOLIC BLOOD PRESSURE: 62 MMHG | SYSTOLIC BLOOD PRESSURE: 126 MMHG

## 2019-07-16 VITALS — DIASTOLIC BLOOD PRESSURE: 69 MMHG | SYSTOLIC BLOOD PRESSURE: 126 MMHG

## 2019-07-16 VITALS — SYSTOLIC BLOOD PRESSURE: 147 MMHG | DIASTOLIC BLOOD PRESSURE: 68 MMHG

## 2019-07-16 PROCEDURE — BF131ZZ FLUOROSCOPY OF GALLBLADDER AND BILE DUCTS USING LOW OSMOLAR CONTRAST: ICD-10-PCS | Performed by: RADIOLOGY

## 2019-07-16 NOTE — NUR
SW reviewed chart and spoke with nursing and attending physician. Pt with
worsening pancreatitis. Pt had a cholangiogram today. Pt has drain in place
and remains on IV abx. Clinical updates to be faxed to [facility]
tomorrow for review. JAVIER is following to assist as needed with discharge
planning.

## 2019-07-16 NOTE — NUR
PATIENT CONT TO PROGRESS TOWARDS GOALS. STARTED ON CLEAR LIQUID DIET TODAY.
NOTED SOME COUGHING WHEN HE DRANK. HEAD OF BED RAISED TO ABOVE 75 DEGREES AND
NO COUGH NOTED. HE DID REFUSE ORAL CARE IN THE MORNING BUT LET NURSE CLEANSE
HIS MOUTH THIS PM. WILL CONT WITH PLAN OF CARE.

## 2019-07-16 NOTE — NUR
Nutrition: Pt NPO day 11. PPN meeting 41% kcal needs, 107% high end protein
needs. No weight since admit, Request obtaining. Suspect loss. RECommend otain
triglyceride level. Pt may benefit from adding lipids to meet higher % of kcal
needs if WNL.

## 2019-07-17 VITALS — DIASTOLIC BLOOD PRESSURE: 68 MMHG | SYSTOLIC BLOOD PRESSURE: 120 MMHG

## 2019-07-17 VITALS — SYSTOLIC BLOOD PRESSURE: 116 MMHG | DIASTOLIC BLOOD PRESSURE: 62 MMHG

## 2019-07-17 VITALS — SYSTOLIC BLOOD PRESSURE: 123 MMHG | DIASTOLIC BLOOD PRESSURE: 69 MMHG

## 2019-07-17 VITALS — DIASTOLIC BLOOD PRESSURE: 70 MMHG | SYSTOLIC BLOOD PRESSURE: 133 MMHG

## 2019-07-17 LAB
ALBUMIN SERPL-MCNC: 1.8 G/DL (ref 3.4–5)
ALT SERPL-CCNC: 19 U/L (ref 30–65)
ANION GAP SERPL CALC-SCNC: 9 MMOL/L (ref 7–16)
AST SERPL-CCNC: 34 U/L (ref 15–37)
BILIRUB SERPL-MCNC: 0.6 MG/DL
BUN SERPL-MCNC: 49 MG/DL (ref 7–18)
CALCIUM SERPL-MCNC: 9 MG/DL (ref 8.5–10.1)
CHLORIDE SERPL-SCNC: 97 MMOL/L (ref 98–107)
CO2 SERPL-SCNC: 22 MMOL/L (ref 21–32)
CREAT SERPL-MCNC: 1.2 MG/DL (ref 0.7–1.3)
ERYTHROCYTE [DISTWIDTH] IN BLOOD BY AUTOMATED COUNT: 16.5 % (ref 10.5–14.5)
GLUCOSE SERPL-MCNC: 110 MG/DL (ref 74–106)
HCT VFR BLD CALC: 23.2 % (ref 42–52)
HGB BLD-MCNC: 7.9 GM/DL (ref 14–18)
MCH RBC QN AUTO: 35.1 PG (ref 26–34)
MCHC RBC AUTO-ENTMCNC: 33.8 G/DL (ref 28–37)
MCV RBC: 103.7 FL (ref 80–100)
PLATELET # BLD: 369 THOU/UL (ref 150–400)
POTASSIUM SERPL-SCNC: 4.8 MMOL/L (ref 3.5–5.1)
PROT SERPL-MCNC: 8.2 G/DL (ref 6.4–8.2)
RBC # BLD AUTO: 2.24 MIL/UL (ref 4.5–6)
SODIUM SERPL-SCNC: 128 MMOL/L (ref 136–145)
WBC # BLD AUTO: 14.3 THOU/UL (ref 4–11)

## 2019-07-17 NOTE — NUR
JAVIER reviewed chart and spoke with nursing and attending physician. Pt is being
started on a diet today. Slowly progressing towards goals for discharge.
Discharge planner to fax clinical/therapy updates to [facility] for
review. Plan is for pt to d/c to [facility] when medically stable. JAVIER
updated Fadumo in admissions. JAVIER attempted to leave message for pt's wife at her
work. Pt's wife is off work. JAVIER is following to assist as needed with
discharge planning.

## 2019-07-17 NOTE — NUR
Sent updates to [facility], notified Fadumo at the facility of incoming
updates being sent.

## 2019-07-17 NOTE — NUR
ASSUMED CARE OF PT AT 0700. PT IS ORIENTATED TO SELF AND KNOWS THAT HE IS IN A
HOSPITAL. PT HAS NO COMPLAINTS OF PAIN. EXHIBITS NO SOB. REMAINS ON RA. PT
REFUSED HIS DAILY B12 INJECTION STATING THAT HE HAS HAD TOO MANY SHOTS
ALREADY. BILIARY DRAIN REMAINS IN PLACE. PT IS PROGRESSING TOWARD POC GOALS.
WILL CONTINUE TO MONITOR AND ASSESS.

## 2019-07-18 VITALS — DIASTOLIC BLOOD PRESSURE: 67 MMHG | SYSTOLIC BLOOD PRESSURE: 136 MMHG

## 2019-07-18 VITALS — DIASTOLIC BLOOD PRESSURE: 64 MMHG | SYSTOLIC BLOOD PRESSURE: 125 MMHG

## 2019-07-18 VITALS — DIASTOLIC BLOOD PRESSURE: 75 MMHG | SYSTOLIC BLOOD PRESSURE: 121 MMHG

## 2019-07-18 VITALS — DIASTOLIC BLOOD PRESSURE: 63 MMHG | SYSTOLIC BLOOD PRESSURE: 136 MMHG

## 2019-07-18 LAB
ANION GAP SERPL CALC-SCNC: 11 MMOL/L (ref 7–16)
BUN SERPL-MCNC: 44 MG/DL (ref 7–18)
CALCIUM SERPL-MCNC: 9.2 MG/DL (ref 8.5–10.1)
CHLORIDE SERPL-SCNC: 97 MMOL/L (ref 98–107)
CO2 SERPL-SCNC: 20 MMOL/L (ref 21–32)
CREAT SERPL-MCNC: 1.1 MG/DL (ref 0.7–1.3)
ERYTHROCYTE [DISTWIDTH] IN BLOOD BY AUTOMATED COUNT: 15.9 % (ref 10.5–14.5)
GLUCOSE SERPL-MCNC: 117 MG/DL (ref 74–106)
HCT VFR BLD CALC: 21.8 % (ref 42–52)
HGB BLD-MCNC: 7.4 GM/DL (ref 14–18)
MCH RBC QN AUTO: 35.1 PG (ref 26–34)
MCHC RBC AUTO-ENTMCNC: 33.8 G/DL (ref 28–37)
MCV RBC: 103.9 FL (ref 80–100)
PLATELET # BLD: 412 THOU/UL (ref 150–400)
POTASSIUM SERPL-SCNC: 4.6 MMOL/L (ref 3.5–5.1)
RBC # BLD AUTO: 2.1 MIL/UL (ref 4.5–6)
SODIUM SERPL-SCNC: 128 MMOL/L (ref 136–145)
WBC # BLD AUTO: 14.3 THOU/UL (ref 4–11)

## 2019-07-18 NOTE — NUR
DISCHARGE PLANNING.  POST ACUTE RECOMMENDED AT DISCHARGE.  REFERRAL FAXED TO
[facility].  CALL PLACED TO FRANCESCO TO NOTIFY.  UNIT SW AWARE.
FOLLOWING TO ASSIST WITH DISCHARGE NEEDS.

## 2019-07-18 NOTE — NUR
Assumed care at 0700 this AM. Patient appearing to make slow, but noticeable
progress toward goals. Patient oriented to person and that he is in the
hospital. Communication is improving with pain concerns voiced along with
eating preferences, and some continence when needing to urinate. Patient
progressively ate more of his meals today with a diet upgrade to soft/fiber
restricted from full liquids. Patient started on PO hydrocodone for
generalized pain by Dr. Negron. Pt appeared to do well with the hydrocodone
and was able to sleep some after administration, but without making him too
drowsy. Patient remains weak but was up to the chair and walked around room
with PT today. Fall precautions in place.

## 2019-07-18 NOTE — NUR
Pt. remains oriented to person and knows he's at the hospital. He has been
repositioned. Moans when being turned stating he's hurting all over. Tylenol
given with some relief. He slept fair during the night. Incontinent of bladder
then used urinal x1.Protective barrier cream applied to buttocks after incont.
External cath at HS. Percutaneous drain flushed with NS.  Bed alarm on for
safety. Tolerating full liquids diet. Slowly making progress towards care plan
goals.

## 2019-07-19 VITALS — SYSTOLIC BLOOD PRESSURE: 125 MMHG | DIASTOLIC BLOOD PRESSURE: 65 MMHG

## 2019-07-19 VITALS — DIASTOLIC BLOOD PRESSURE: 68 MMHG | SYSTOLIC BLOOD PRESSURE: 123 MMHG

## 2019-07-19 VITALS — DIASTOLIC BLOOD PRESSURE: 48 MMHG | SYSTOLIC BLOOD PRESSURE: 109 MMHG

## 2019-07-19 VITALS — SYSTOLIC BLOOD PRESSURE: 138 MMHG | DIASTOLIC BLOOD PRESSURE: 73 MMHG

## 2019-07-19 LAB
ALBUMIN SERPL-MCNC: 1.8 G/DL (ref 3.4–5)
ALT SERPL-CCNC: 16 U/L (ref 30–65)
ANION GAP SERPL CALC-SCNC: 9 MMOL/L (ref 7–16)
AST SERPL-CCNC: 27 U/L (ref 15–37)
BILIRUB DIRECT SERPL-MCNC: 0.3 MG/DL
BILIRUB SERPL-MCNC: 0.6 MG/DL
BUN SERPL-MCNC: 42 MG/DL (ref 7–18)
CALCIUM SERPL-MCNC: 9.2 MG/DL (ref 8.5–10.1)
CHLORIDE SERPL-SCNC: 97 MMOL/L (ref 98–107)
CO2 SERPL-SCNC: 22 MMOL/L (ref 21–32)
CREAT SERPL-MCNC: 1.1 MG/DL (ref 0.7–1.3)
ERYTHROCYTE [DISTWIDTH] IN BLOOD BY AUTOMATED COUNT: 16.4 % (ref 10.5–14.5)
GLUCOSE SERPL-MCNC: 106 MG/DL (ref 74–106)
HCT VFR BLD CALC: 21.1 % (ref 42–52)
HGB BLD-MCNC: 7.2 GM/DL (ref 14–18)
MCH RBC QN AUTO: 35.9 PG (ref 26–34)
MCHC RBC AUTO-ENTMCNC: 34.1 G/DL (ref 28–37)
MCV RBC: 105.1 FL (ref 80–100)
PLATELET # BLD: 423 THOU/UL (ref 150–400)
POTASSIUM SERPL-SCNC: 4.7 MMOL/L (ref 3.5–5.1)
PROT SERPL-MCNC: 7.9 G/DL (ref 6.4–8.2)
RBC # BLD AUTO: 2.01 MIL/UL (ref 4.5–6)
SODIUM SERPL-SCNC: 128 MMOL/L (ref 136–145)
WBC # BLD AUTO: 11.7 THOU/UL (ref 4–11)

## 2019-07-19 NOTE — NUR
PT IS A&OX2 ( PERSON AND PLACE), pt is forgetful, pt has poor eating, pt is
continuing PPN AT 100ML/HR, PT'S VS ARE STABLE , PT GETS UP TO CHAIR WITH
ASSIST AND STAY ABOUT 2HR, PT'S PAIN IS IN CONTROI BY MEDICATION.

## 2019-07-19 NOTE — NUR
Pt. slept fair during the night. He has been repositioned. Moans when being
turned stating he hurts all over. Hydrocodone given at HS then again this am
with some relief. Tolerating room air well. Tolerating current diet.
Incontinent of bladder then able to use urinal at times with assistance. Right
cholecystostomy tube intact with yw green dge. Bed alarm on and SCD's in
place. Pt. oriented to person and place only ,follows commands and answers
questions aappropriately  most of the time. Making some progress towards care
plan goals.

## 2019-07-20 VITALS — DIASTOLIC BLOOD PRESSURE: 68 MMHG | SYSTOLIC BLOOD PRESSURE: 122 MMHG

## 2019-07-20 VITALS — DIASTOLIC BLOOD PRESSURE: 69 MMHG | SYSTOLIC BLOOD PRESSURE: 113 MMHG

## 2019-07-20 VITALS — DIASTOLIC BLOOD PRESSURE: 66 MMHG | SYSTOLIC BLOOD PRESSURE: 126 MMHG

## 2019-07-20 VITALS — SYSTOLIC BLOOD PRESSURE: 120 MMHG | DIASTOLIC BLOOD PRESSURE: 66 MMHG

## 2019-07-20 LAB
ALBUMIN SERPL-MCNC: 1.8 G/DL (ref 3.4–5)
ALT SERPL-CCNC: 18 U/L (ref 30–65)
ANION GAP SERPL CALC-SCNC: 9 MMOL/L (ref 7–16)
AST SERPL-CCNC: 26 U/L (ref 15–37)
BILIRUB SERPL-MCNC: 0.5 MG/DL
BUN SERPL-MCNC: 42 MG/DL (ref 7–18)
CALCIUM SERPL-MCNC: 9.4 MG/DL (ref 8.5–10.1)
CHLORIDE SERPL-SCNC: 99 MMOL/L (ref 98–107)
CO2 SERPL-SCNC: 24 MMOL/L (ref 21–32)
CREAT SERPL-MCNC: 1.1 MG/DL (ref 0.7–1.3)
ERYTHROCYTE [DISTWIDTH] IN BLOOD BY AUTOMATED COUNT: 16.2 % (ref 10.5–14.5)
GLUCOSE SERPL-MCNC: 109 MG/DL (ref 74–106)
HCT VFR BLD CALC: 23.7 % (ref 42–52)
HGB BLD-MCNC: 8 GM/DL (ref 14–18)
LIPASE: 439 U/L (ref 73–393)
MCH RBC QN AUTO: 35.3 PG (ref 26–34)
MCHC RBC AUTO-ENTMCNC: 33.7 G/DL (ref 28–37)
MCV RBC: 104.6 FL (ref 80–100)
PLATELET # BLD: 499 THOU/UL (ref 150–400)
POTASSIUM SERPL-SCNC: 4.7 MMOL/L (ref 3.5–5.1)
PROT SERPL-MCNC: 8.6 G/DL (ref 6.4–8.2)
RBC # BLD AUTO: 2.26 MIL/UL (ref 4.5–6)
SODIUM SERPL-SCNC: 132 MMOL/L (ref 136–145)
WBC # BLD AUTO: 9.5 THOU/UL (ref 4–11)

## 2019-07-20 NOTE — NUR
Patient making slow progress towards outcome goals. Fair pain control
with Tylenol and Ibuprofen, able to express pain. Incontinent od bladder at
times. Hypoactive bowel sounds, no BM. Vital signs and rhythm stable. Biliary
drain patent. Poor appetite reported by night shift requiring continues PPN
infusing.

## 2019-07-21 VITALS — SYSTOLIC BLOOD PRESSURE: 125 MMHG | DIASTOLIC BLOOD PRESSURE: 74 MMHG

## 2019-07-21 VITALS — SYSTOLIC BLOOD PRESSURE: 130 MMHG | DIASTOLIC BLOOD PRESSURE: 62 MMHG

## 2019-07-21 VITALS — SYSTOLIC BLOOD PRESSURE: 126 MMHG | DIASTOLIC BLOOD PRESSURE: 67 MMHG

## 2019-07-21 NOTE — NUR
needs encouraging to drink, his mouth has been very dry tonight. continues on
PPN and antibiotics. drain to ruq has been flushed and is draining. does
complain fo generalized pain. po pain reliever is partially effective.

## 2019-07-21 NOTE — NUR
PATIENT IS NOT MAKING MUCH PROGRESS TOWARDS DISCHARGE GOALS. HE HAS REFUSED TO
EAT ALL THREE MEALS. HE WILL TASTE A PART OF THE MEAL AND REFUSE THE REST.
ENCOURAGE TO DRINK ENSURE SUPPLEMENT BUT HE WILL DRINK ONLY A VERY SMALL
AMOUNT AND REFUSE. NOT EASILY ENCOURAGED. NOT EASILY REDIRECTED. STILL IN PAIN
TO RIGHT SHOULDER WHEN MOVED. HE HAS SLEPT MOST OF THE DAY NOT REQUIRING PAIN
MEDICATION. WILL CONT WITH PLAN OF CARE.

## 2019-07-22 VITALS — DIASTOLIC BLOOD PRESSURE: 70 MMHG | SYSTOLIC BLOOD PRESSURE: 117 MMHG

## 2019-07-22 VITALS — SYSTOLIC BLOOD PRESSURE: 151 MMHG | DIASTOLIC BLOOD PRESSURE: 73 MMHG

## 2019-07-22 VITALS — DIASTOLIC BLOOD PRESSURE: 60 MMHG | SYSTOLIC BLOOD PRESSURE: 117 MMHG

## 2019-07-22 VITALS — SYSTOLIC BLOOD PRESSURE: 128 MMHG | DIASTOLIC BLOOD PRESSURE: 68 MMHG

## 2019-07-22 VITALS — SYSTOLIC BLOOD PRESSURE: 130 MMHG | DIASTOLIC BLOOD PRESSURE: 56 MMHG

## 2019-07-22 NOTE — NUR
patient is alert times three. patient is up one to two with walker. patient is
nsr on tele. patient has poor appitite. plan is to encourage po intake so
patient can get off ppn. patient has bilidrain to RUQ with orders to flush
10cc Q8h. dressing clean dry and intact and is two days old. due to change
dressing tomorrow. patinet is impulsive at times. patient is easy to redirect.
patient is rest comfortably in bed. pain medication treats pain. patient is
not prgressing to goals as far as nutrition. wcm.

## 2019-07-22 NOTE — NUR
JAVIER reviewed chart and spoke with nursing and attending physician. Pt remains
on PPN and may need to be started on TPN if not having adequate intake. JAVIER met
with pt and wife at bedside to provide update and discuss recommendation for
LTAC placement. SW provided list of LTACs for review. Pt's wife prefers
[facility] due to location. SW explained difference in level of care at LTAC
v. SNF. Pt's wife verbalized understanding. Discharge planner provided updated
clinical info to [facility] liaison. JAVIER faxed additional info. Awaiting decision
from [facility] corporate office if they are able to accept. JAVIER updated Fadumo at
[facility]. JAVIER is following to assist as needed with discharge planning.

## 2019-07-22 NOTE — NUR
DP GAVE UPDATES TO Stormy/pROMISE.  WAITING FOR AUTHORIZATION, WILL SEND MORE
THERAPY AS IT COMES IN.

## 2019-07-23 VITALS — SYSTOLIC BLOOD PRESSURE: 123 MMHG | DIASTOLIC BLOOD PRESSURE: 55 MMHG

## 2019-07-23 VITALS — DIASTOLIC BLOOD PRESSURE: 79 MMHG | SYSTOLIC BLOOD PRESSURE: 116 MMHG

## 2019-07-23 VITALS — SYSTOLIC BLOOD PRESSURE: 118 MMHG | DIASTOLIC BLOOD PRESSURE: 67 MMHG

## 2019-07-23 LAB
ALBUMIN SERPL-MCNC: 1.8 G/DL (ref 3.4–5)
ALT SERPL-CCNC: 16 U/L (ref 30–65)
ANION GAP SERPL CALC-SCNC: 9 MMOL/L (ref 7–16)
AST SERPL-CCNC: 29 U/L (ref 15–37)
BILIRUB SERPL-MCNC: 0.3 MG/DL
BUN SERPL-MCNC: 44 MG/DL (ref 7–18)
CALCIUM SERPL-MCNC: 9.4 MG/DL (ref 8.5–10.1)
CHLORIDE SERPL-SCNC: 99 MMOL/L (ref 98–107)
CO2 SERPL-SCNC: 24 MMOL/L (ref 21–32)
CREAT SERPL-MCNC: 1.1 MG/DL (ref 0.7–1.3)
ERYTHROCYTE [DISTWIDTH] IN BLOOD BY AUTOMATED COUNT: 16.3 % (ref 10.5–14.5)
GLUCOSE SERPL-MCNC: 102 MG/DL (ref 74–106)
HCT VFR BLD CALC: 21.7 % (ref 42–52)
HGB BLD-MCNC: 7.4 GM/DL (ref 14–18)
INR PPP: 1.1
MCH RBC QN AUTO: 35.8 PG (ref 26–34)
MCHC RBC AUTO-ENTMCNC: 34.4 G/DL (ref 28–37)
MCV RBC: 104.2 FL (ref 80–100)
PLATELET # BLD: 421 THOU/UL (ref 150–400)
POTASSIUM SERPL-SCNC: 4.5 MMOL/L (ref 3.5–5.1)
PROT SERPL-MCNC: 8.8 G/DL (ref 6.4–8.2)
PROTHROMBIN TIME: 11.5 SECONDS (ref 9.3–11.4)
RBC # BLD AUTO: 2.08 MIL/UL (ref 4.5–6)
SODIUM SERPL-SCNC: 132 MMOL/L (ref 136–145)
WBC # BLD AUTO: 7 THOU/UL (ref 4–11)

## 2019-07-23 PROCEDURE — 0DB98ZX EXCISION OF DUODENUM, VIA NATURAL OR ARTIFICIAL OPENING ENDOSCOPIC, DIAGNOSTIC: ICD-10-PCS | Performed by: INTERNAL MEDICINE

## 2019-07-23 PROCEDURE — 0DHA3UZ INSERTION OF FEEDING DEVICE INTO JEJUNUM, PERCUTANEOUS APPROACH: ICD-10-PCS | Performed by: INTERNAL MEDICINE

## 2019-07-23 NOTE — NUR
PATIENT WILL BE DISCHARGED TO LTAC THIS PM. HE IS ALERT TO SELF AND PLACE. HE
DOES NOT SEEM TO BE IN PAIN AT THIS TIME. RESPIRATIONS ARE NON LABORED. FAMILY
HERE. STARTED ON NJ TUBE AND FEEDING HAS BEEN ADJUST UPWARDS TO 40MLS/HR AS
PATIENT IS TOLERATING WELL. WILL CONT WITH PLAN OF CARE.

## 2019-07-23 NOTE — NUR
DISCHARGE NOTE:
JAVIER reviewed chart and spoke with nursing and attending physician. Pt is
medically stable for discharge today. Pt had NJ tube placed earlier today.
Updated clinical info faxed to [facility] for review. JAVIER discussed with [facility]
liaison, who states they are able to accept pt today. Wheelchair van
transportation scheduled for 1630 per [facility]'s arrangements. JAVIER left voice
message for pt's wife to notify of acceptance by [facility] and transportation
time. JAVIER updated attending physician. Awaiting final discharge orders/summary
at this time. Nursing provided with number to call report. Chart copy ordered.
JAVIER is following to finalize discharge.

## 2019-07-23 NOTE — NUR
PATIENT IS ALERT TO SELF AND SITUATION. PATIENT IS ROOM AIR. PATIENTS PAIN IS
CONTROLLED WITH PAIN MEDICATION. PATIENT IS Q2TURN BUT PATIENT IS ABLE TO TURN
SELF IN BED. PATIENTS BILI DRAIN IS CLEAN DRY AND INTACT. PATIENTS DRAIN IS
FLUSHED. PATIENT HAS BEEN NPO SENSE MIDNIGHT. PENDING NG TUBE PLACEMENT.
PATINET IS ACHS ACCUCHECKS. PATIENT IS UP TIMES 2 WITH WALKER. PATIENT IS NSR
ON TELE. PATIENT IS RESTING COMFORTABLY IN BED. WCM. PATIENT IS PROGRESSING TO
GOALS. PPN RUNNING FOR NUTRITION. NG PENDING PLACEMENT TODAY.

## 2019-09-11 ENCOUNTER — HOSPITAL ENCOUNTER (EMERGENCY)
Dept: HOSPITAL 35 - ER | Age: 79
Discharge: HOME | End: 2019-09-11
Payer: COMMERCIAL

## 2019-09-11 VITALS — SYSTOLIC BLOOD PRESSURE: 145 MMHG | DIASTOLIC BLOOD PRESSURE: 65 MMHG

## 2019-09-11 VITALS — HEIGHT: 72 IN | BODY MASS INDEX: 20.32 KG/M2 | WEIGHT: 150 LBS

## 2019-09-11 DIAGNOSIS — Z86.73: ICD-10-CM

## 2019-09-11 DIAGNOSIS — I10: ICD-10-CM

## 2019-09-11 DIAGNOSIS — Z95.5: ICD-10-CM

## 2019-09-11 DIAGNOSIS — Z91.041: ICD-10-CM

## 2019-09-11 DIAGNOSIS — E78.5: ICD-10-CM

## 2019-09-11 DIAGNOSIS — Z96.643: ICD-10-CM

## 2019-09-11 DIAGNOSIS — N40.0: ICD-10-CM

## 2019-09-11 DIAGNOSIS — R10.84: Primary | ICD-10-CM

## 2019-09-11 DIAGNOSIS — Z87.891: ICD-10-CM

## 2019-09-11 DIAGNOSIS — Z88.0: ICD-10-CM

## 2019-09-11 DIAGNOSIS — Z88.8: ICD-10-CM

## 2019-09-11 DIAGNOSIS — Z88.6: ICD-10-CM

## 2019-09-11 DIAGNOSIS — I25.10: ICD-10-CM

## 2019-09-11 DIAGNOSIS — Z91.048: ICD-10-CM

## 2019-09-11 DIAGNOSIS — R11.2: ICD-10-CM

## 2019-09-11 DIAGNOSIS — Z98.890: ICD-10-CM

## 2019-09-11 DIAGNOSIS — Z88.1: ICD-10-CM

## 2019-09-11 LAB
ALBUMIN SERPL-MCNC: 3.3 G/DL (ref 3.4–5)
ALT SERPL-CCNC: 21 U/L (ref 30–65)
ANION GAP SERPL CALC-SCNC: 10 MMOL/L (ref 7–16)
AST SERPL-CCNC: 33 U/L (ref 15–37)
BASOPHILS NFR BLD AUTO: 0.5 % (ref 0–2)
BILIRUB SERPL-MCNC: 0.4 MG/DL
BILIRUB UR-MCNC: NEGATIVE MG/DL
BUN SERPL-MCNC: 18 MG/DL (ref 7–18)
CALCIUM SERPL-MCNC: 9.9 MG/DL (ref 8.5–10.1)
CHLORIDE SERPL-SCNC: 100 MMOL/L (ref 98–107)
CO2 SERPL-SCNC: 24 MMOL/L (ref 21–32)
COLOR UR: YELLOW
CREAT SERPL-MCNC: 1.4 MG/DL (ref 0.7–1.3)
EOSINOPHIL NFR BLD: 0.4 % (ref 0–3)
ERYTHROCYTE [DISTWIDTH] IN BLOOD BY AUTOMATED COUNT: 14.9 % (ref 10.5–14.5)
GLUCOSE SERPL-MCNC: 88 MG/DL (ref 74–106)
GRANULOCYTES NFR BLD MANUAL: 42.5 % (ref 36–66)
HCT VFR BLD CALC: 29.4 % (ref 42–52)
HGB BLD-MCNC: 9.7 GM/DL (ref 14–18)
KETONES UR STRIP-MCNC: NEGATIVE MG/DL
LIPASE: 114 U/L (ref 73–393)
LYMPHOCYTES NFR BLD AUTO: 48 % (ref 24–44)
MCH RBC QN AUTO: 32.9 PG (ref 26–34)
MCHC RBC AUTO-ENTMCNC: 32.9 G/DL (ref 28–37)
MCV RBC: 99.8 FL (ref 80–100)
MONOCYTES NFR BLD: 8.6 % (ref 1–8)
NEUTROPHILS # BLD: 2.3 THOU/UL (ref 1.4–8.2)
PLATELET # BLD: 182 THOU/UL (ref 150–400)
POTASSIUM SERPL-SCNC: 3.8 MMOL/L (ref 3.5–5.1)
PROT SERPL-MCNC: 8.8 G/DL (ref 6.4–8.2)
RBC # BLD AUTO: 2.95 MIL/UL (ref 4.5–6)
RBC # UR STRIP: NEGATIVE /UL
SODIUM SERPL-SCNC: 134 MMOL/L (ref 136–145)
SP GR UR STRIP: <= 1.005 (ref 1–1.03)
TROPONIN I SERPL-MCNC: <0.06 NG/ML (ref ?–0.06)
URINE CLARITY: CLEAR
URINE GLUCOSE-RANDOM*: NEGATIVE
URINE LEUKOCYTES-REFLEX: NEGATIVE
URINE NITRITE-REFLEX: NEGATIVE
URINE PROTEIN (DIPSTICK): NEGATIVE
UROBILINOGEN UR STRIP-ACNC: 0.2 E.U./DL (ref 0.2–1)
WBC # BLD AUTO: 5.4 THOU/UL (ref 4–11)

## 2020-08-26 ENCOUNTER — HOSPITAL ENCOUNTER (EMERGENCY)
Dept: HOSPITAL 35 - ER | Age: 80
Discharge: LEFT BEFORE DISCHARGE | End: 2020-08-26
Payer: COMMERCIAL

## 2020-08-26 VITALS — WEIGHT: 170 LBS | BODY MASS INDEX: 23.03 KG/M2 | HEIGHT: 72 IN

## 2020-08-26 VITALS — DIASTOLIC BLOOD PRESSURE: 72 MMHG | SYSTOLIC BLOOD PRESSURE: 115 MMHG

## 2020-08-26 DIAGNOSIS — Z98.890: ICD-10-CM

## 2020-08-26 DIAGNOSIS — Z79.82: ICD-10-CM

## 2020-08-26 DIAGNOSIS — R41.82: Primary | ICD-10-CM

## 2020-08-26 DIAGNOSIS — Z88.1: ICD-10-CM

## 2020-08-26 DIAGNOSIS — E78.5: ICD-10-CM

## 2020-08-26 DIAGNOSIS — I10: ICD-10-CM

## 2020-08-26 DIAGNOSIS — R10.84: ICD-10-CM

## 2020-08-26 DIAGNOSIS — Z79.899: ICD-10-CM

## 2020-08-26 DIAGNOSIS — Z88.0: ICD-10-CM

## 2020-08-26 DIAGNOSIS — I25.10: ICD-10-CM

## 2020-08-26 DIAGNOSIS — Z20.828: ICD-10-CM

## 2020-08-26 DIAGNOSIS — Z87.891: ICD-10-CM

## 2020-08-26 DIAGNOSIS — Z88.8: ICD-10-CM

## 2020-08-26 LAB
ALBUMIN SERPL-MCNC: 3.9 G/DL (ref 3.4–5)
ALT SERPL-CCNC: 16 U/L (ref 30–65)
ANION GAP SERPL CALC-SCNC: 11 MMOL/L (ref 7–16)
AST SERPL-CCNC: 27 U/L (ref 15–37)
BASOPHILS NFR BLD AUTO: 1.6 % (ref 0–2)
BILIRUB SERPL-MCNC: 0.6 MG/DL (ref 0.2–1)
BILIRUB UR-MCNC: NEGATIVE MG/DL
BUN SERPL-MCNC: 17 MG/DL (ref 7–18)
CALCIUM SERPL-MCNC: 9.5 MG/DL (ref 8.5–10.1)
CHLORIDE SERPL-SCNC: 101 MMOL/L (ref 98–107)
CO2 SERPL-SCNC: 21 MMOL/L (ref 21–32)
COLOR UR: YELLOW
CREAT SERPL-MCNC: 1.7 MG/DL (ref 0.7–1.3)
EOSINOPHIL NFR BLD: 0.3 % (ref 0–3)
ERYTHROCYTE [DISTWIDTH] IN BLOOD BY AUTOMATED COUNT: 13.3 % (ref 10.5–14.5)
GLUCOSE SERPL-MCNC: 92 MG/DL (ref 74–106)
GRANULOCYTES NFR BLD MANUAL: 52.4 % (ref 36–66)
HCT VFR BLD CALC: 30.5 % (ref 42–52)
HGB BLD-MCNC: 10.6 GM/DL (ref 14–18)
KETONES UR STRIP-MCNC: NEGATIVE MG/DL
LIPASE: 85 U/L (ref 73–393)
LYMPHOCYTES NFR BLD AUTO: 34.9 % (ref 24–44)
MCH RBC QN AUTO: 33.2 PG (ref 26–34)
MCHC RBC AUTO-ENTMCNC: 34.8 G/DL (ref 28–37)
MCV RBC: 95.3 FL (ref 80–100)
MONOCYTES NFR BLD: 10.8 % (ref 1–8)
NEUTROPHILS # BLD: 3.7 THOU/UL (ref 1.4–8.2)
PLATELET # BLD: 170 THOU/UL (ref 150–400)
POTASSIUM SERPL-SCNC: 3.8 MMOL/L (ref 3.5–5.1)
PROT SERPL-MCNC: 9 G/DL (ref 6.4–8.2)
RBC # BLD AUTO: 3.2 MIL/UL (ref 4.5–6)
RBC # UR STRIP: NEGATIVE /UL
SODIUM SERPL-SCNC: 133 MMOL/L (ref 136–145)
SP GR UR STRIP: 1.01 (ref 1–1.03)
URINE CLARITY: CLEAR
URINE GLUCOSE-RANDOM*: NEGATIVE
URINE LEUKOCYTES-REFLEX: NEGATIVE
URINE NITRITE-REFLEX: NEGATIVE
URINE PROTEIN (DIPSTICK): NEGATIVE
UROBILINOGEN UR STRIP-ACNC: 0.2 E.U./DL (ref 0.2–1)
WBC # BLD AUTO: 7 THOU/UL (ref 4–11)

## 2020-08-27 NOTE — EKG
[facility]
[address]
[place]   [address]                     ELECTROCARDIOGRAM REPORT      
_______________________________________________________________________________
 
Name:       RAMU ZIMMER             [id number]                       Account #:      [id number]  
Admission:  20    Attend Phys:                          
Discharge:  20    Date of Birth:  [birth year]  
                                                          Report #: [id number]
                                                                    [id number]
_______________________________________________________________________________
THIS REPORT FOR:  
 
cc:  Maximo Goldberg MD, Eric K. MD Couchonnal, Luis F. MD                                            ~
THIS REPORT FOR:   //name//                          
 
                         [facility] ED
                                       
Test Date:    2020               Test Time:    17:11:44
Pat Name:     RAMU ZIMMER          Department:   
Patient ID:   [id number]            Room:         [facility]
Gender:       M                        Technician:   
:          1940               Requested By: Antoine Nolasco
Order Number: [id number] MD:   Alex Woody
                                 Measurements
Intervals                              Axis          
Rate:         61                       P:            -32
AR:           201                      QRS:          32
QRSD:         105                      T:            29
QT:           435                                    
QTc:          439                                    
                           Interpretive Statements
Sinus rhythm
Abnormal R-wave progression, early transition
Compared to ECG 2019 17:54:13
No significant changes
Electronically Signed On 2020 9:12:51 CDT by Alex Woody
[email or web address]
 
 
 
 
 
 
 
 
 
 
 
 
 
 
 
  <ELECTRONICALLY SIGNED>
   By: Alex Woody MD        
  20     0912
D: 20                           _____________________________________
T: 20                           Alex Woody MD          /EPI

## 2020-08-28 NOTE — EKG
[facility]
[address] [place]   [address]                     ELECTROCARDIOGRAM REPORT      
_______________________________________________________________________________
 
Name:       RAMU ZIMMER             [facility] #:                     [id number]                       Account #:      [id number]  
Admission:  20    Attend Phys:                          
Discharge:  20    Date of Birth:  [birth year]  
                                                          Report #: [id number]
                                                                    [id number]
_______________________________________________________________________________
THIS REPORT FOR:  
 
cc:  Maximo Goldberg MD, Eric K. MD Lundgren,Jarek BETTS MD [facility]                                        ~
THIS REPORT FOR:   //name//                          
 
                         [facility] ED
                                       
Test Date:    2020               Test Time:    19:38:48
Pat Name:     RAMU ZIMMER          Department:   
Patient ID:   [id number]            Room:          
Gender:                               Technician:   
:          [id number]               Requested By: Antoine Nolasco
Order Number: [id number] MD:   Jarek Griffin
                                 Measurements
Intervals                              Axis          
Rate:         60                       P:            42
ID:           [id number]                      QRS:          35
QRSD:         91                       T:            26
QT:           435                                    
QTc:          435                                    
                           Interpretive Statements
Sinus rhythm
Borderline prolonged ID interval
Compared to ECG 2020 17:11:44
No significant changes
Electronically Signed On 2020 8:19:28 CDT by Jarek Griffin
[email or web address]
 
 
 
 
 
 
 
 
 
 
 
 
 
 
 
  <ELECTRONICALLY SIGNED>
   By: Jarek Griffin MD, FAC   
  20
D: 20                           _____________________________________
T: 20                           Jarek Griffin MD, [facility]     /EPI